# Patient Record
Sex: MALE | Race: WHITE | URBAN - METROPOLITAN AREA
[De-identification: names, ages, dates, MRNs, and addresses within clinical notes are randomized per-mention and may not be internally consistent; named-entity substitution may affect disease eponyms.]

---

## 2018-06-14 ENCOUNTER — TELEPHONE (OUTPATIENT)
Dept: FAMILY MEDICINE | Facility: CLINIC | Age: 43
End: 2018-06-14

## 2018-06-14 ENCOUNTER — OFFICE VISIT (OUTPATIENT)
Dept: FAMILY MEDICINE | Facility: CLINIC | Age: 43
End: 2018-06-14
Payer: COMMERCIAL

## 2018-06-14 VITALS
DIASTOLIC BLOOD PRESSURE: 88 MMHG | TEMPERATURE: 96.9 F | BODY MASS INDEX: 36.37 KG/M2 | HEIGHT: 68 IN | RESPIRATION RATE: 17 BRPM | SYSTOLIC BLOOD PRESSURE: 126 MMHG | OXYGEN SATURATION: 95 % | HEART RATE: 82 BPM | WEIGHT: 240 LBS

## 2018-06-14 DIAGNOSIS — Z13.1 SCREENING FOR DIABETES MELLITUS: ICD-10-CM

## 2018-06-14 DIAGNOSIS — Z90.5 HISTORY OF KIDNEY DONATION: ICD-10-CM

## 2018-06-14 DIAGNOSIS — D22.9 MULTIPLE PIGMENTED NEVI: ICD-10-CM

## 2018-06-14 DIAGNOSIS — Z83.79 FAMILY HISTORY OF CELIAC DISEASE: ICD-10-CM

## 2018-06-14 DIAGNOSIS — R80.9 PROTEINURIA, UNSPECIFIED TYPE: ICD-10-CM

## 2018-06-14 DIAGNOSIS — R06.83 SNORING: ICD-10-CM

## 2018-06-14 DIAGNOSIS — Z13.0 SCREENING, ANEMIA, DEFICIENCY, IRON: ICD-10-CM

## 2018-06-14 DIAGNOSIS — Z23 NEED FOR TDAP VACCINATION: ICD-10-CM

## 2018-06-14 DIAGNOSIS — Z00.00 ROUTINE GENERAL MEDICAL EXAMINATION AT A HEALTH CARE FACILITY: Primary | ICD-10-CM

## 2018-06-14 DIAGNOSIS — Z91.09 ENVIRONMENTAL ALLERGIES: ICD-10-CM

## 2018-06-14 DIAGNOSIS — Z13.6 ENCOUNTER FOR SCREENING FOR CARDIOVASCULAR DISORDERS: ICD-10-CM

## 2018-06-14 DIAGNOSIS — R31.29 MICROSCOPIC HEMATURIA: Primary | ICD-10-CM

## 2018-06-14 DIAGNOSIS — Z11.3 SCREEN FOR STD (SEXUALLY TRANSMITTED DISEASE): ICD-10-CM

## 2018-06-14 DIAGNOSIS — R31.29 MICROSCOPIC HEMATURIA: ICD-10-CM

## 2018-06-14 DIAGNOSIS — R80.9 MICROALBUMINURIA: ICD-10-CM

## 2018-06-14 DIAGNOSIS — R19.7 DIARRHEA, UNSPECIFIED TYPE: ICD-10-CM

## 2018-06-14 DIAGNOSIS — D23.9 DERMATOFIBROMA: ICD-10-CM

## 2018-06-14 DIAGNOSIS — B35.4 TINEA CORPORIS: ICD-10-CM

## 2018-06-14 LAB
ALBUMIN SERPL-MCNC: 3.7 G/DL (ref 3.4–5)
ALBUMIN UR-MCNC: 100 MG/DL
ALP SERPL-CCNC: 84 U/L (ref 40–150)
ALT SERPL W P-5'-P-CCNC: 51 U/L (ref 0–70)
ANION GAP SERPL CALCULATED.3IONS-SCNC: 7 MMOL/L (ref 3–14)
APPEARANCE UR: CLEAR
AST SERPL W P-5'-P-CCNC: 24 U/L (ref 0–45)
BACTERIA #/AREA URNS HPF: ABNORMAL /HPF
BASOPHILS # BLD AUTO: 0 10E9/L (ref 0–0.2)
BASOPHILS NFR BLD AUTO: 0.4 %
BILIRUB SERPL-MCNC: 0.4 MG/DL (ref 0.2–1.3)
BILIRUB UR QL STRIP: NEGATIVE
BUN SERPL-MCNC: 12 MG/DL (ref 7–30)
CALCIUM SERPL-MCNC: 8.9 MG/DL (ref 8.5–10.1)
CHLORIDE SERPL-SCNC: 107 MMOL/L (ref 94–109)
CHOLEST SERPL-MCNC: 165 MG/DL
CO2 SERPL-SCNC: 27 MMOL/L (ref 20–32)
COLOR UR AUTO: YELLOW
CREAT SERPL-MCNC: 1.07 MG/DL (ref 0.66–1.25)
DIFFERENTIAL METHOD BLD: NORMAL
EOSINOPHIL # BLD AUTO: 0.2 10E9/L (ref 0–0.7)
EOSINOPHIL NFR BLD AUTO: 1.7 %
ERYTHROCYTE [DISTWIDTH] IN BLOOD BY AUTOMATED COUNT: 13.5 % (ref 10–15)
GFR SERPL CREATININE-BSD FRML MDRD: 76 ML/MIN/1.7M2
GLUCOSE SERPL-MCNC: 103 MG/DL (ref 70–99)
GLUCOSE UR STRIP-MCNC: NEGATIVE MG/DL
HBV SURFACE AB SERPL IA-ACNC: 0 M[IU]/ML
HBV SURFACE AG SERPL QL IA: NONREACTIVE
HCT VFR BLD AUTO: 44.9 % (ref 40–53)
HCV AB SERPL QL IA: NONREACTIVE
HDLC SERPL-MCNC: 29 MG/DL
HGB BLD-MCNC: 14.9 G/DL (ref 13.3–17.7)
HGB UR QL STRIP: ABNORMAL
HIV 1+2 AB+HIV1 P24 AG SERPL QL IA: NONREACTIVE
KETONES UR STRIP-MCNC: NEGATIVE MG/DL
LDLC SERPL CALC-MCNC: 103 MG/DL
LEUKOCYTE ESTERASE UR QL STRIP: NEGATIVE
LYMPHOCYTES # BLD AUTO: 2.5 10E9/L (ref 0.8–5.3)
LYMPHOCYTES NFR BLD AUTO: 24.9 %
MCH RBC QN AUTO: 29.3 PG (ref 26.5–33)
MCHC RBC AUTO-ENTMCNC: 33.2 G/DL (ref 31.5–36.5)
MCV RBC AUTO: 88 FL (ref 78–100)
MONOCYTES # BLD AUTO: 1.1 10E9/L (ref 0–1.3)
MONOCYTES NFR BLD AUTO: 11.1 %
NEUTROPHILS # BLD AUTO: 6.1 10E9/L (ref 1.6–8.3)
NEUTROPHILS NFR BLD AUTO: 61.9 %
NITRATE UR QL: NEGATIVE
NONHDLC SERPL-MCNC: 136 MG/DL
PH UR STRIP: 5.5 PH (ref 5–7)
PLATELET # BLD AUTO: 283 10E9/L (ref 150–450)
POTASSIUM SERPL-SCNC: 4.7 MMOL/L (ref 3.4–5.3)
PROT SERPL-MCNC: 7.7 G/DL (ref 6.8–8.8)
RBC # BLD AUTO: 5.08 10E12/L (ref 4.4–5.9)
RBC #/AREA URNS AUTO: ABNORMAL /HPF
SODIUM SERPL-SCNC: 141 MMOL/L (ref 133–144)
SOURCE: ABNORMAL
SP GR UR STRIP: 1.02 (ref 1–1.03)
TRIGL SERPL-MCNC: 163 MG/DL
TSH SERPL DL<=0.005 MIU/L-ACNC: 2.98 MU/L (ref 0.4–4)
UROBILINOGEN UR STRIP-ACNC: 1 EU/DL (ref 0.2–1)
WBC # BLD AUTO: 9.8 10E9/L (ref 4–11)
WBC #/AREA URNS AUTO: ABNORMAL /HPF

## 2018-06-14 PROCEDURE — 80061 LIPID PANEL: CPT | Performed by: FAMILY MEDICINE

## 2018-06-14 PROCEDURE — 86706 HEP B SURFACE ANTIBODY: CPT | Performed by: FAMILY MEDICINE

## 2018-06-14 PROCEDURE — 90715 TDAP VACCINE 7 YRS/> IM: CPT | Performed by: FAMILY MEDICINE

## 2018-06-14 PROCEDURE — 90471 IMMUNIZATION ADMIN: CPT | Performed by: FAMILY MEDICINE

## 2018-06-14 PROCEDURE — 99386 PREV VISIT NEW AGE 40-64: CPT | Mod: 25 | Performed by: FAMILY MEDICINE

## 2018-06-14 PROCEDURE — 99213 OFFICE O/P EST LOW 20 MIN: CPT | Mod: 25 | Performed by: FAMILY MEDICINE

## 2018-06-14 PROCEDURE — 86803 HEPATITIS C AB TEST: CPT | Performed by: FAMILY MEDICINE

## 2018-06-14 PROCEDURE — 81001 URINALYSIS AUTO W/SCOPE: CPT | Performed by: FAMILY MEDICINE

## 2018-06-14 PROCEDURE — 82043 UR ALBUMIN QUANTITATIVE: CPT | Performed by: FAMILY MEDICINE

## 2018-06-14 PROCEDURE — 87389 HIV-1 AG W/HIV-1&-2 AB AG IA: CPT | Performed by: FAMILY MEDICINE

## 2018-06-14 PROCEDURE — 87086 URINE CULTURE/COLONY COUNT: CPT | Performed by: FAMILY MEDICINE

## 2018-06-14 PROCEDURE — 84443 ASSAY THYROID STIM HORMONE: CPT | Performed by: FAMILY MEDICINE

## 2018-06-14 PROCEDURE — 87340 HEPATITIS B SURFACE AG IA: CPT | Performed by: FAMILY MEDICINE

## 2018-06-14 PROCEDURE — 83516 IMMUNOASSAY NONANTIBODY: CPT | Performed by: FAMILY MEDICINE

## 2018-06-14 PROCEDURE — 36415 COLL VENOUS BLD VENIPUNCTURE: CPT | Performed by: FAMILY MEDICINE

## 2018-06-14 PROCEDURE — 83516 IMMUNOASSAY NONANTIBODY: CPT | Mod: 91 | Performed by: FAMILY MEDICINE

## 2018-06-14 PROCEDURE — 80053 COMPREHEN METABOLIC PANEL: CPT | Performed by: FAMILY MEDICINE

## 2018-06-14 PROCEDURE — 85025 COMPLETE CBC W/AUTO DIFF WBC: CPT | Performed by: FAMILY MEDICINE

## 2018-06-14 NOTE — LETTER
Nancy 15, 2018      Liliam Berg  [address] [facility] [address]        Dear ,    We are writing to inform you of your test results.    Negative for urine infection    Resulted Orders   UA reflex to Microscopic and Culture   Result Value Ref Range    Color Urine Yellow     Appearance Urine Clear     Glucose Urine Negative NEG^Negative mg/dL    Bilirubin Urine Negative NEG^Negative    Ketones Urine Negative NEG^Negative mg/dL    Specific Gravity Urine 1.020 1.003 - 1.035    Blood Urine Large (A) NEG^Negative    pH Urine 5.5 5.0 - 7.0 pH    Protein Albumin Urine 100 (A) NEG^Negative mg/dL    Urobilinogen Urine 1.0 0.2 - 1.0 EU/dL    Nitrite Urine Negative NEG^Negative    Leukocyte Esterase Urine Negative NEG^Negative    Source Midstream Urine    CBC with platelets differential   Result Value Ref Range    WBC 9.8 4.0 - 11.0 10e9/L    RBC Count 5.08 4.4 - 5.9 10e12/L    Hemoglobin 14.9 13.3 - 17.7 g/dL    Hematocrit 44.9 40.0 - 53.0 %    MCV 88 78 - 100 fl    MCH 29.3 26.5 - 33.0 pg    MCHC 33.2 31.5 - 36.5 g/dL    RDW 13.5 10.0 - 15.0 %    Platelet Count 283 150 - 450 10e9/L    Diff Method Automated Method     % Neutrophils 61.9 %    % Lymphocytes 24.9 %    % Monocytes 11.1 %    % Eosinophils 1.7 %    % Basophils 0.4 %    Absolute Neutrophil 6.1 1.6 - 8.3 10e9/L    Absolute Lymphocytes 2.5 0.8 - 5.3 10e9/L    Absolute Monocytes 1.1 0.0 - 1.3 10e9/L    Absolute Eosinophils 0.2 0.0 - 0.7 10e9/L    Absolute Basophils 0.0 0.0 - 0.2 10e9/L   Comprehensive metabolic panel   Result Value Ref Range    Sodium 141 133 - 144 mmol/L    Potassium 4.7 3.4 - 5.3 mmol/L    Chloride 107 94 - 109 mmol/L    Carbon Dioxide 27 20 - 32 mmol/L    Anion Gap 7 3 - 14 mmol/L    Glucose 103 (H) 70 - 99 mg/dL      Comment:      Fasting specimen    Urea Nitrogen 12 7 - 30 mg/dL    Creatinine 1.07 0.66 - 1.25 mg/dL    GFR Estimate 76 >60 mL/min/1.7m2      Comment:      Non  GFR Calc    GFR Estimate If Black >90 >60  mL/min/1.7m2      Comment:       GFR Calc    Calcium 8.9 8.5 - 10.1 mg/dL    Bilirubin Total 0.4 0.2 - 1.3 mg/dL    Albumin 3.7 3.4 - 5.0 g/dL    Protein Total 7.7 6.8 - 8.8 g/dL    Alkaline Phosphatase 84 40 - 150 U/L    ALT 51 0 - 70 U/L    AST 24 0 - 45 U/L   Lipid panel reflex to direct LDL Fasting   Result Value Ref Range    Cholesterol 165 <200 mg/dL    Triglycerides 163 (H) <150 mg/dL      Comment:      Borderline high:  150-199 mg/dl  High:             200-499 mg/dl  Very high:       >499 mg/dl  Fasting specimen      HDL Cholesterol 29 (L) >39 mg/dL    LDL Cholesterol Calculated 103 (H) <100 mg/dL      Comment:      Above desirable:  100-129 mg/dl  Borderline High:  130-159 mg/dL  High:             160-189 mg/dL  Very high:       >189 mg/dl      Non HDL Cholesterol 136 (H) <130 mg/dL      Comment:      Above Desirable:  130-159 mg/dl  Borderline high:  160-189 mg/dl  High:             190-219 mg/dl  Very high:       >219 mg/dl     TSH with free T4 reflex   Result Value Ref Range    TSH 2.98 0.40 - 4.00 mU/L   Hepatitis B Surface Antibody   Result Value Ref Range    Hepatitis B Surface Antibody 0.00 <8.00 m[IU]/mL      Comment:      Nonreactive, No antibody detected when the value is less than 8.00 m[IU]/mL.   Hepatitis B surface antigen   Result Value Ref Range    Hep B Surface Agn Nonreactive NR^Nonreactive   Hepatitis C Screen Reflex to HCV RNA Quant and Genotype   Result Value Ref Range    Hepatitis C Antibody Nonreactive NR^Nonreactive      Comment:      Assay performance characteristics have not been established for newborns,   infants, and children     HIV Antigen Antibody Combo   Result Value Ref Range    HIV Antigen Antibody Combo Nonreactive NR^Nonreactive          Comment:      HIV-1 p24 Ag & HIV-1/HIV-2 Ab Not Detected   Urine Microscopic   Result Value Ref Range    WBC Urine 5-10 (A) OTO5^0 - 5 /HPF    RBC Urine 25-50 (A) OTO2^O - 2 /HPF    Bacteria Urine Few (A) NEG^Negative /HPF    Urine Culture Aerobic Bacterial   Result Value Ref Range    Specimen Description Midstream Urine     Culture Micro No growth        If you have any questions or concerns, please call the clinic at the number listed above.     Sincerely,      Christal Garrison MD/nr

## 2018-06-14 NOTE — PROGRESS NOTES
SUBJECTIVE:   CC: Liliam Berg is an 42 year old male who presents for preventative health visit.     Healthy Habits:  Answers for HPI/ROS submitted by the patient on 6/14/2018   Annual Exam:  Getting at least 3 servings of Calcium per day:: NO  Bi-annual eye exam:: NO  Dental care twice a year:: NO  Sleep apnea or symptoms of sleep apnea:: Daytime drowsiness, Excessive snoring, Sleep apnea  Diet:: Regular (no restrictions)  Frequency of exercise:: None  Taking medications regularly:: Yes  Medication side effects:: None  Additional concerns today:: YES  PHQ-2 Score: 0    New to me, not seen in system since 2006 just by PT for thoracic pain at that time. Hx of prior kidney donation, No inform on [facility], [place]  negative. Here for a physical. .  Sleeping concern      Duration: 10 yrs     Description (location/character/radiation):  Snoring and drowsiness in the day time      Intensity:  severe    Accompanying signs and symptoms:  Stop breathing and irregular snoring        History (similar episodes/previous evaluation): None    Precipitating or alleviating factors: None    Therapies tried and outcome: marijuana        Also requesting kidney function lab/ test its been 20 yrs      20 yrs ago donated a left kidney. Not seen doctor since then. 10 yrs ago had a ankle injury  Bad sleep apnea,     No fever or chills, no headache or dizziness, no double or blurry vision, no facial pain, earache, sore throat, runny nose, post nasal drip, no trouble hearing, smelling, tasting or swallowing, no cough , no chest pain, trouble breathing or palpitations, stomach big , food poisoning few weeks ago , still not hundred percent, loose stools not back to regular , started couldn't eta,2 weeks ago , watery diarrhea 4 days, thought getting better last weekend again symptoms with loose stools, working back getting better, no blood or black stools, mild nausea, not vomiting, dry heaving , tolerating food ok., No abdominal pain, hx of  heart burn, reflux common, no weight loss or weight gain,  or night sweats. , warmer at night,  No dysuria, hematuria, frequency, urgency, hesitancy, incontinence, No pelvic complaints. No leg swelling or joint pain. No rash other than spots on left chest thinks from allergic reaction to detergent.     Hx of allergies to dust, pollen, cockroaches, feels intolerance to wheat , bloating for beer and wheat at times, brother has celiac.     Care every where signed and no records seen other than ankle xray in 2005    Today's PHQ-2 Score:   PHQ-2 ( 1999 [employer]) 6/14/2018 6/14/2018   Q1: Little interest or pleasure in doing things 0 0   Q2: Feeling down, depressed or hopeless 0 0   PHQ-2 Score 0 0   Q1: Little interest or pleasure in doing things Not at all -   Q2: Feeling down, depressed or hopeless Not at all -   PHQ-2 Score 0 -     Abuse: Current or Past(Physical, Sexual or Emotional)- No  Do you feel safe in your environment - YesYes    Social History   Substance Use Topics     Smoking status: Never Smoker     Smokeless tobacco: Never Used     Alcohol use Not on file      If you drink alcohol do you typically have >3 drinks per day or >7 drinks per week? Yes - AUDIT SCORE:  5  AUDIT - Alcohol Use Disorders Identification Test - Reproduced from the World Health Organization Audit 2001 (Second Edition) 6/14/2018   1.  How often do you have a drink containing alcohol? 4 or more times a week   2.  How many drinks containing alcohol do you have on a typical day when you are drinking? 1 or 2   3.  How often do you have five or more drinks on one occasion? Less than monthly   4.  How often during the last year have you found that you were not able to stop drinking once you had started? Never   5.  How often during the last year have you failed to do what was normally expected of you because of drinking? Never   6.  How often during the last year have you needed a first drink in the morning to get yourself going after a heavy  drinking session? Never   7.  How often during the last year have you had a feeling of guilt or remorse after drinking? Never   8.  How often during the last year have you been unable to remember what happened the night before because of your drinking? Never   9.  Have you or someone else been injured because of your drinking? No   10. Has a relative, friend, doctor or other health care worker been concerned about your drinking or suggested you cut down? No   TOTAL SCORE 5                         Last PSA: No results found for: PSA    Reviewed orders with patient. Reviewed health maintenance and updated orders accordingly - Yes  Labs reviewed in EPIC  BP Readings from Last 3 Encounters:   06/14/18 126/88    Wt Readings from Last 3 Encounters:   06/14/18 240 lb (108.9 kg)                  Patient Active Problem List   Diagnosis   (none) - all problems resolved or deleted     Past Surgical History:   Procedure Laterality Date     [facility] KIDNEY DONOR LIVING  1998       Social History   Substance Use Topics     Smoking status: Never Smoker     Smokeless tobacco: Never Used     Alcohol use Yes      Comment: 1 to 2 each about 4 / week      Family History   Problem Relation Age of Onset     HEART DISEASE Father      Thyroid Cancer Sister          No current outpatient prescriptions on file.     Allergies   Allergen Reactions     Cockroach      Dust Mites      Pollen Extract      No lab results found.     Reviewed and updated as needed this visit by clinical staff  Allergies         Reviewed and updated as needed this visit by Provider        Past Medical History:   Diagnosis Date     iamPAIN IN THORACIC SPINE 9/21/2006      Past Surgical History:   Procedure Laterality Date     [facility] KIDNEY DONOR LIVING  1998            ROS:  CONSTITUTIONAL: NEGATIVE for fever, chills, change in weight  INTEGUMENTARY/SKIN: NEGATIVE for worrisome rashes, moles or lesions except as noted on HPI , aloso bump right arm and left back of hand had  "a while no change.  EYES: NEGATIVE for vision changes or irritation  ENT: NEGATIVE for ear, mouth and throat problems  RESP: NEGATIVE for significant cough or SOB  CV: NEGATIVE for chest pain, palpitations or peripheral edema  GI: as in HPI   male: negative for dysuria, hematuria, decreased urinary stream, erectile dysfunction, urethral discharge  MUSCULOSKELETAL: NEGATIVE for significant arthralgias or myalgia  NEURO: NEGATIVE for weakness, dizziness or paresthesias  ENDOCRINE: NEGATIVE for temperature intolerance, skin/hair changes  HEME/ALLERGY/IMMUNE: NEGATIVE for bleeding problems  PSYCHIATRIC: NEGATIVE for changes in mood or affect    OBJECTIVE:   /88 (BP Location: Left arm, Patient Position: Chair, Cuff Size: Adult Large)  Pulse 82  Temp 96.9  F (36.1  C) (Tympanic)  Resp 17  Ht 5' 7.5\" (1.715 m)  Wt 240 lb (108.9 kg)  SpO2 95%  BMI 37.03 kg/m2  EXAM:  GENERAL: healthy, alert and no distress  EYES: Eyes grossly normal to inspection, PERRL and conjunctivae and sclerae normal  HENT: ear canals and TM's normal, nose and mouth without ulcers or lesions, oropharynx crowded, Mallampati score 3  NECK: no adenopathy, no asymmetry, masses, or scars and thyroid normal to palpation  RESP: lungs clear to auscultation - no rales, rhonchi or wheezes  CV: regular rate and rhythm, normal S1 S2, no S3 or S4, no murmur, click or rub, no peripheral edema and peripheral pulses strong  ABDOMEN: soft, non tender, no hepatosplenomegaly, no masses and bowel sounds normal, left circumferential scar form prior nephrectomy for donation to brother 20 yrs ago  MS: no gross musculoskeletal defects noted, no edema  SKIN: let anterior lower chest circular red rash with central clearing , mild raised borders about 4 to 5 of them coalescing, multiple pigmented nevi and mole son trunk and torso and arms. 1/2 cm dermatofibroma papule right arm and dorsum left hand. Otherwise no suspicious lesions or rashes  NEURO: Normal strength " and tone, mentation intact and speech normal  PSYCH: mentation appears normal, affect normal/bright    ASSESSMENT/PLAN:   1. Routine general medical examination at a health care facility  Signed a release so we can review records. Labs today. Sleep referral made. Diet, exercise, weight loss at least 5 % body weight. Allergy referral made. Tdap due. Limit alcohol to one drink in a day no more than 7 total a week. Marijuana can cause weight gain. Use over the counter athletes foot cream to rash on left chest twice a day till 1 month after resolves.   - CBC with platelets differential  - Comprehensive metabolic panel  - Lipid panel reflex to direct LDL Fasting  - Hepatitis B Surface Antibody  - Hepatitis B surface antigen  - Hepatitis C Screen Reflex to HCV RNA Quant and Genotype  - HIV Antigen Antibody Combo  - VACCINE ADMINISTRATION, INITIAL  - TDAP VACCINE (ADACEL)    2. History of kidney donation  - Albumin Random Urine Quantitative with Creat Ratio  - UA reflex to Microscopic and Culture  - CMP    3. Snoring  - TSH with free T4 reflex  - [place]AL[place] MANAG[place] [phone]  (Age 18 and up); Future  - OFFICE/OUTPT VISIT,EST,LEVL III    4. Diarrhea, unspecified type  Recent viral gastroenteritis getting better but also family history of celiac, will check   - Tissue transglutaminase danni IgA and IgG  - OFFICE/OUTPT VISIT,EST,LEVL III    5. Family history of celiac disease  - Tissue transglutaminase danni IgA and IgG    6. BMI 37.0-37.9, adult  Diet, exercise, weight loss, limit alcohol, avoid marijuana  - TSH with free T4 reflex    7. Environmental allergies  - ALLERGY/ASTHMA ADULT REFERRAL  - OFFICE/OUTPT VISIT,EST,LEVL III    8. Multiple pigmented nevi  Monitor.     9. Tinea corporis  Use over the counter athletes foot cream to rash on left chest twice a day till 1 month after resolves  - OFFICE/OUTPT VISIT,EST,LEVL III    10. Dermatofibroma  Right arm and  "left dorsum hand about 1/2 cm papule, monitor.    11. Need for Tdap vaccination  - VACCINE ADMINISTRATION, INITIAL  - TDAP VACCINE (ADACEL)    12. Screening, anemia, deficiency, iron  - CBC with platelets differential    13. Screening for diabetes mellitus  - Comprehensive metabolic panel    14. Encounter for screening for cardiovascular disorders  - Lipid panel reflex to direct LDL Fasting    15. Screen for STD (sexually transmitted disease)  Declines need for GC , syphilis, given prior surgery check for below.  - Hepatitis B Surface Antibody  - Hepatitis B surface antigen  - Hepatitis C Screen Reflex to HCV RNA Quant and Genotype  - HIV Antigen Antibody Combo    COUNSELING:  Reviewed preventive health counseling, as reflected in patient instructions       Regular exercise       Healthy diet/nutrition       Vision screening       Hearing screening       Immunizations    Vaccinated for: TDAP             Alcohol Use       Safe sex practices/STD prevention       Consider Hep C screening for patients born between 1945 and 1965       HIV screeninx in teen years, 1x in adult years, and at intervals if high risk    BP Screening:   Last 3 BP Readings:    BP Readings from Last 3 Encounters:   18 126/88       The following was recommended to the patient:  Re-screen BP within a year and recommended lifestyle modifications   reports that he has never smoked. He has never used smokeless tobacco.    Estimated body mass index is 37.03 kg/(m^2) as calculated from the following:    Height as of this encounter: 5' 7.5\" (1.715 m).    Weight as of this encounter: 240 lb (108.9 kg).   Weight management plan: Discussed healthy diet and exercise guidelines and patient will follow up in 12 months in clinic to re-evaluate.    Counseling Resources:  ATP IV Guidelines  Pooled Cohorts Equation Calculator  FRAX Risk Assessment  ICSI Preventive Guidelines  Dietary Guidelines for Americans, 2010  USDA's MyPlate  ASA Prophylaxis  Lung " CA Screening    Christal Garrison MD  [facility]

## 2018-06-14 NOTE — TELEPHONE ENCOUNTER
Cbc normal  Urine shows some white cells, protein and bacteria, will add culture to see whats going on as await rest of labs. Want to rule out infection.   If negative may recommend rechecking urine sample later in the day as morning samples sometimes show blood and protein at times. If repeat positive would then recommend to see nephrology if needed.   Can close encounter when done

## 2018-06-14 NOTE — LETTER
June 14, 2018      Liliam Berg  [address] [facility] [address]        Dear ,    We are writing to inform you of your test results.    Normal red blood cell (hgb) levels, normal white blood cell count and normal platelet levels. Rest of labs pending .    Resulted Orders   CBC with platelets differential   Result Value Ref Range    WBC 9.8 4.0 - 11.0 10e9/L    RBC Count 5.08 4.4 - 5.9 10e12/L    Hemoglobin 14.9 13.3 - 17.7 g/dL    Hematocrit 44.9 40.0 - 53.0 %    MCV 88 78 - 100 fl    MCH 29.3 26.5 - 33.0 pg    MCHC 33.2 31.5 - 36.5 g/dL    RDW 13.5 10.0 - 15.0 %    Platelet Count 283 150 - 450 10e9/L    Diff Method Automated Method     % Neutrophils 61.9 %    % Lymphocytes 24.9 %    % Monocytes 11.1 %    % Eosinophils 1.7 %    % Basophils 0.4 %    Absolute Neutrophil 6.1 1.6 - 8.3 10e9/L    Absolute Lymphocytes 2.5 0.8 - 5.3 10e9/L    Absolute Monocytes 1.1 0.0 - 1.3 10e9/L    Absolute Eosinophils 0.2 0.0 - 0.7 10e9/L    Absolute Basophils 0.0 0.0 - 0.2 10e9/L     If you have any questions or concerns, please call the clinic at the number listed above.   Sincerely,  Christal Garrison MD/nr

## 2018-06-14 NOTE — TELEPHONE ENCOUNTER
Dr Garrison,    Can we wait until the labs all done before calling pt since she was just seen today and asymptomatic?    Silvia Keyes, RN, BSN

## 2018-06-14 NOTE — PROGRESS NOTES
-Normal red blood cell (hgb) levels, normal white blood cell count and normal platelet levels. Rest of labs pending .

## 2018-06-14 NOTE — LETTER
Nancy 15, 2018      Liliam Berg  [address] [address]        Dear ,    We are writing to inform you of your test results.    Results within acceptable limits.   Negative for Hepatis B but not immune consider vaccination against hepatitis B with series of 3 shots. If desires can schedule to get in an [place] only appt  Negative for HIV     -Liver and gallbladder tests (ALT,AST, Alk phos,bilirubin) are normal.  -Kidney function (GFR) is normal.  -Sodium is normal.  -Potassium is normal.  -Glucose is slight elevated and may be sign of early diabetes (prediabetes). ADVISE:: low carbohydrate diet, exercise, try to lose weight (if necessary) and recheck glucose in 12 months. (GLU,A1C, DX: prediabetes)  -LDL(bad) cholesterol level is elevated, HDL(good) cholesterol level is low and your triglycerides are elevated which can increase your heart disease risk.  A diet high in fat and simple carbohydrates, genetics and being overweight can contribute to this. ADVISE: Exercise, a low fat, low carbohydrate diet, weight control, and omega-3 fatty acids (fish oil) 6069-1641 mg daily are helpful to improve this.  Rechecking your fasting cholesterol panel in 12 months is recommended (Lipid w/ LDL reflex, DX: hyperlipidemia)  -TSH (thyroid stimulating hormone) level is normal which indicates normal thyroid function.  -Hepatitis C antibody screen test shows no signs of a previous hepatitis C infection.    Resulted Orders   UA reflex to Microscopic and Culture   Result Value Ref Range    Color Urine Yellow     Appearance Urine Clear     Glucose Urine Negative NEG^Negative mg/dL    Bilirubin Urine Negative NEG^Negative    Ketones Urine Negative NEG^Negative mg/dL    Specific Gravity Urine 1.020 1.003 - 1.035    Blood Urine Large (A) NEG^Negative    pH Urine 5.5 5.0 - 7.0 pH    Protein Albumin Urine 100 (A) NEG^Negative mg/dL    Urobilinogen Urine 1.0 0.2 - 1.0 EU/dL    Nitrite Urine Negative NEG^Negative     Leukocyte Esterase Urine Negative NEG^Negative    Source Midstream Urine    CBC with platelets differential   Result Value Ref Range    WBC 9.8 4.0 - 11.0 10e9/L    RBC Count 5.08 4.4 - 5.9 10e12/L    Hemoglobin 14.9 13.3 - 17.7 g/dL    Hematocrit 44.9 40.0 - 53.0 %    MCV 88 78 - 100 fl    MCH 29.3 26.5 - 33.0 pg    MCHC 33.2 31.5 - 36.5 g/dL    RDW 13.5 10.0 - 15.0 %    Platelet Count 283 150 - 450 10e9/L    Diff Method Automated Method     % Neutrophils 61.9 %    % Lymphocytes 24.9 %    % Monocytes 11.1 %    % Eosinophils 1.7 %    % Basophils 0.4 %    Absolute Neutrophil 6.1 1.6 - 8.3 10e9/L    Absolute Lymphocytes 2.5 0.8 - 5.3 10e9/L    Absolute Monocytes 1.1 0.0 - 1.3 10e9/L    Absolute Eosinophils 0.2 0.0 - 0.7 10e9/L    Absolute Basophils 0.0 0.0 - 0.2 10e9/L   Comprehensive metabolic panel   Result Value Ref Range    Sodium 141 133 - 144 mmol/L    Potassium 4.7 3.4 - 5.3 mmol/L    Chloride 107 94 - 109 mmol/L    Carbon Dioxide 27 20 - 32 mmol/L    Anion Gap 7 3 - 14 mmol/L    Glucose 103 (H) 70 - 99 mg/dL      Comment:      Fasting specimen    Urea Nitrogen 12 7 - 30 mg/dL    Creatinine 1.07 0.66 - 1.25 mg/dL    GFR Estimate 76 >60 mL/min/1.7m2      Comment:      Non  GFR Calc    GFR Estimate If Black >90 >60 mL/min/1.7m2      Comment:       GFR Calc    Calcium 8.9 8.5 - 10.1 mg/dL    Bilirubin Total 0.4 0.2 - 1.3 mg/dL    Albumin 3.7 3.4 - 5.0 g/dL    Protein Total 7.7 6.8 - 8.8 g/dL    Alkaline Phosphatase 84 40 - 150 U/L    ALT 51 0 - 70 U/L    AST 24 0 - 45 U/L   Lipid panel reflex to direct LDL Fasting   Result Value Ref Range    Cholesterol 165 <200 mg/dL    Triglycerides 163 (H) <150 mg/dL      Comment:      Borderline high:  150-199 mg/dl  High:             200-499 mg/dl  Very high:       >499 mg/dl  Fasting specimen      HDL Cholesterol 29 (L) >39 mg/dL    LDL Cholesterol Calculated 103 (H) <100 mg/dL      Comment:      Above desirable:  100-129 mg/dl  Borderline  High:  130-159 mg/dL  High:             160-189 mg/dL  Very high:       >189 mg/dl      Non HDL Cholesterol 136 (H) <130 mg/dL      Comment:      Above Desirable:  130-159 mg/dl  Borderline high:  160-189 mg/dl  High:             190-219 mg/dl  Very high:       >219 mg/dl     TSH with free T4 reflex   Result Value Ref Range    TSH 2.98 0.40 - 4.00 mU/L   Hepatitis B Surface Antibody   Result Value Ref Range    Hepatitis B Surface Antibody 0.00 <8.00 m[IU]/mL      Comment:      Nonreactive, No antibody detected when the value is less than 8.00 m[IU]/mL.   Hepatitis B surface antigen   Result Value Ref Range    Hep B Surface Agn Nonreactive NR^Nonreactive   Hepatitis C Screen Reflex to HCV RNA Quant and Genotype   Result Value Ref Range    Hepatitis C Antibody Nonreactive NR^Nonreactive      Comment:      Assay performance characteristics have not been established for newborns,   infants, and children     HIV Antigen Antibody Combo   Result Value Ref Range    HIV Antigen Antibody Combo Nonreactive NR^Nonreactive          Comment:      HIV-1 p24 Ag & HIV-1/HIV-2 Ab Not Detected   Urine Microscopic   Result Value Ref Range    WBC Urine 5-10 (A) OTO5^0 - 5 /HPF    RBC Urine 25-50 (A) OTO2^O - 2 /HPF    Bacteria Urine Few (A) NEG^Negative /HPF       If you have any questions or concerns, please call the clinic at the number listed above.       Sincerely,        Christal Garrison MD/nr

## 2018-06-14 NOTE — NURSING NOTE
Screening Questionnaire for Adult Immunization    Are you sick today?   No   Do you have allergies to medications, food, a vaccine component or latex?   No   Have you ever had a serious reaction after receiving a vaccination?   No   Do you have a long-term health problem with heart disease, lung disease, asthma, kidney disease, metabolic disease (e.g. diabetes), anemia, or other blood disorder?   No   Do you have cancer, leukemia, HIV/AIDS, or any other immune system problem?   No   In the past 3 months, have you taken medications that affect  your immune system, such as prednisone, other steroids, or anticancer drugs; drugs for the treatment of rheumatoid arthritis, Crohn s disease, or psoriasis; or have you had radiation treatments?   No   Have you had a seizure, or a brain or other nervous system problem?   No   During the past year, have you received a transfusion of blood or blood     products, or been given immune (gamma) globulin or antiviral drug?   No   For women: Are you pregnant or is there a chance you could become        pregnant during the next month?   No   Have you received any vaccinations in the past 4 weeks?   No     Immunization questionnaire answers were all negative.        Per orders of Christal Goss, injection of Tdap given by Vivi Resendiz. Patient instructed to remain in clinic for 15 minutes afterwards, and to report any adverse reaction to me immediately.       Screening performed by Vivi Resendiz on 6/14/2018 at 9:09 AM.

## 2018-06-15 LAB
BACTERIA SPEC CULT: NO GROWTH
CREAT UR-MCNC: 143 MG/DL
MICROALBUMIN UR-MCNC: 271 MG/L
MICROALBUMIN/CREAT UR: 189.51 MG/G CR (ref 0–17)
SPECIMEN SOURCE: NORMAL

## 2018-06-15 NOTE — PROGRESS NOTES
Results within acceptable limits.   Negative for Hepatis B but not immune consider vaccination against hepatitis B with series of 3 shots. If desires can schedule to get in an MA only appt  Negative for HIV     -Liver and gallbladder tests (ALT,AST, Alk phos,bilirubin) are normal.  -Kidney function (GFR) is normal.  -Sodium is normal.  -Potassium is normal.  -Glucose is slight elevated and may be sign of early diabetes (prediabetes). ADVISE:: low carbohydrate diet, exercise, try to lose weight (if necessary) and recheck glucose in 12 months. (GLU,A1C, DX: prediabetes)  -LDL(bad) cholesterol level is elevated, HDL(good) cholesterol level is low and your triglycerides are elevated which can increase your heart disease risk.  A diet high in fat and simple carbohydrates, genetics and being overweight can contribute to this. ADVISE: Exercise, a low fat, low carbohydrate diet, weight control, and omega-3 fatty acids (fish oil) 3855-6923 mg daily are helpful to improve this.  Rechecking your fasting cholesterol panel in 12 months is recommended (Lipid w/ LDL reflex, DX: hyperlipidemia)  -TSH (thyroid stimulating hormone) level is normal which indicates normal thyroid function.  -Hepatitis C antibody screen test shows no signs of a previous hepatitis C infection.

## 2018-06-15 NOTE — TELEPHONE ENCOUNTER
Patient was sent My Chart message with provider comments and referral information.  Mylene Carrillo RN

## 2018-06-15 NOTE — TELEPHONE ENCOUNTER
micro albumin elevated ( loss of protein from kidneys  Some red cells in urine  No urine infection seen on culture to explain this  Celiac testing not back but rest of testing including kidney function normal   See below note sent earlier.   Given microalbuminuria, only one kidney, ( due to prior donation ) and blood cells in urine recommend seeing nephrology to be further evaluated. Referral placed     Negative for Hepatis B but not immune consider vaccination against hepatitis B with series of 3 shots. If desires can schedule to get in an MA only appt  Negative for HIV     -Liver and gallbladder tests (ALT,AST, Alk phos,bilirubin) are normal.  -Kidney function (GFR) is normal.  -Sodium is normal.  -Potassium is normal.  -Glucose is slight elevated and may be sign of early diabetes (prediabetes). ADVISE:: low carbohydrate diet, exercise, try to lose weight (if necessary) and recheck glucose in 12 months. (GLU,A1C, DX: prediabetes)  -LDL(bad) cholesterol level is elevated, HDL(good) cholesterol level is low and your triglycerides are elevated which can increase your heart disease risk.  A diet high in fat and simple carbohydrates, genetics and being overweight can contribute to this. ADVISE: Exercise, a low fat, low carbohydrate diet, weight control, and omega-3 fatty acids (fish oil) 1604-3219 mg daily are helpful to improve this.  Rechecking your fasting cholesterol panel in 12 months is recommended (Lipid w/ LDL reflex, DX: hyperlipidemia)  -TSH (thyroid stimulating hormone) level is normal which indicates normal thyroid function.  -Hepatitis C antibody screen test shows no signs of a previous hepatitis C infection.

## 2018-06-18 LAB
TTG IGA SER-ACNC: 5 U/ML
TTG IGG SER-ACNC: <1 U/ML

## 2018-06-18 NOTE — PROGRESS NOTES
Elysia Mr. Berg,  Your results came back negative for celiac. If you have any further concerns please do not hesitate to contact us by message, phone or making an appointment.  Have a good day   Dr Bladimir FRANCO

## 2018-06-25 ENCOUNTER — OFFICE VISIT (OUTPATIENT)
Dept: FAMILY MEDICINE | Facility: CLINIC | Age: 43
End: 2018-06-25
Payer: COMMERCIAL

## 2018-06-25 VITALS
SYSTOLIC BLOOD PRESSURE: 121 MMHG | OXYGEN SATURATION: 95 % | TEMPERATURE: 98.6 F | RESPIRATION RATE: 17 BRPM | WEIGHT: 234.75 LBS | HEART RATE: 81 BPM | BODY MASS INDEX: 36.22 KG/M2 | DIASTOLIC BLOOD PRESSURE: 86 MMHG

## 2018-06-25 DIAGNOSIS — R31.29 MICROSCOPIC HEMATURIA: ICD-10-CM

## 2018-06-25 DIAGNOSIS — E66.01 MORBID OBESITY (H): ICD-10-CM

## 2018-06-25 DIAGNOSIS — R19.7 DIARRHEA, UNSPECIFIED TYPE: Primary | ICD-10-CM

## 2018-06-25 DIAGNOSIS — Z78.9 NOT IMMUNE TO HEPATITIS B VIRUS: ICD-10-CM

## 2018-06-25 DIAGNOSIS — R80.9 MICROALBUMINURIA: ICD-10-CM

## 2018-06-25 DIAGNOSIS — R80.9 PROTEINURIA, UNSPECIFIED TYPE: ICD-10-CM

## 2018-06-25 DIAGNOSIS — E78.00 ELEVATED LDL CHOLESTEROL LEVEL: ICD-10-CM

## 2018-06-25 DIAGNOSIS — Z90.5 HISTORY OF KIDNEY DONATION: ICD-10-CM

## 2018-06-25 PROBLEM — Z91.09 ENVIRONMENTAL ALLERGIES: Status: ACTIVE | Noted: 2018-06-25

## 2018-06-25 PROBLEM — D23.9 DERMATOFIBROMA: Status: ACTIVE | Noted: 2018-06-25

## 2018-06-25 PROBLEM — Z83.79 FAMILY HISTORY OF CELIAC DISEASE: Status: ACTIVE | Noted: 2018-06-25

## 2018-06-25 PROBLEM — D22.9 MULTIPLE PIGMENTED NEVI: Status: ACTIVE | Noted: 2018-06-25

## 2018-06-25 PROBLEM — R06.83 SNORING: Status: ACTIVE | Noted: 2018-06-25

## 2018-06-25 LAB
ALBUMIN UR-MCNC: 30 MG/DL
APPEARANCE UR: CLEAR
BACTERIA #/AREA URNS HPF: ABNORMAL /HPF
BILIRUB UR QL STRIP: NEGATIVE
COLOR UR AUTO: YELLOW
GLUCOSE UR STRIP-MCNC: NEGATIVE MG/DL
HGB UR QL STRIP: ABNORMAL
KETONES UR STRIP-MCNC: NEGATIVE MG/DL
LEUKOCYTE ESTERASE UR QL STRIP: NEGATIVE
NITRATE UR QL: NEGATIVE
PH UR STRIP: 5.5 PH (ref 5–7)
RBC #/AREA URNS AUTO: ABNORMAL /HPF
SOURCE: ABNORMAL
SP GR UR STRIP: 1.01 (ref 1–1.03)
UROBILINOGEN UR STRIP-ACNC: 0.2 EU/DL (ref 0.2–1)
WBC #/AREA URNS AUTO: ABNORMAL /HPF

## 2018-06-25 PROCEDURE — 80053 COMPREHEN METABOLIC PANEL: CPT | Performed by: FAMILY MEDICINE

## 2018-06-25 PROCEDURE — 81001 URINALYSIS AUTO W/SCOPE: CPT | Performed by: FAMILY MEDICINE

## 2018-06-25 PROCEDURE — 99214 OFFICE O/P EST MOD 30 MIN: CPT | Performed by: FAMILY MEDICINE

## 2018-06-25 PROCEDURE — 36415 COLL VENOUS BLD VENIPUNCTURE: CPT | Performed by: FAMILY MEDICINE

## 2018-06-25 PROCEDURE — 82043 UR ALBUMIN QUANTITATIVE: CPT | Performed by: FAMILY MEDICINE

## 2018-06-25 NOTE — PROGRESS NOTES
SUBJECTIVE:   Liliam Berg is a 42 year old male who presents to clinic today for the following health issues:    Pt is here due to possible exposure to Cyclospora at a local bar and debora, states there was a news article about the breakout, matches the timeframe when he was at the place, ate there 3 times during these times. Has been having all the symptoms that were listed: watery diarrhea, lost of appetite, stomach cramp , nausea, dehydration, weight loss.  He spoke to the dept of health & is here for testing.     Seen 6/14/18 for a routine preventive physical after not doctoring in a long time. Prior thoracic back pain treated with PT in 2006 had resolved & noted had prior left kidney donated. Noted allergies to dust, pollen, cockroach & noted boating with wheat & beer & FH of brother with celiac.was referred to allergy & to sleep for snoring. Advised OTC athletes foot cream to tinea corporis rash on chest past 1 month. Was given Tdap, reassured on multiple pigmented nevi & std screening done. He mentioned having loose watery stool that started 2 weeks prior with cramping & bloating then that seemed to be getting better. His UA was negative for UTI, but showed microscopic hematuria & microalbuminuria. His celiac test, cbc, cp, TSH, hep C, hep B, HIV, were unremarkable except for elevated glucose ( not fasting ) elevated LDL & low HDL Also noted proteinuria of 100 on u dip.     The loose watery stool have been going on now 4 weeks. Amounts are up and down. First 2 weeks were worse, now can occur every couple days. Like today went three times before 830 am & by third time it was loose. Has lost about 10 lbs since illness began & of that 6 lbs since last seen on 6/14. Knows he is obese & feels good start to loosing weight but not healthy way to loose weight from an infection. No fever or chills, no headache or dizziness, no double or blurry vision, no facial pain, earache, sore throat, runny nose, post nasal  drip, no trouble hearing, smelling, tasting or swallowing, no cough, no chest pain, trouble breathing or palpitations, no nausea or vomiting, no blood in stools or black stools, feels malaise, no night sweats. No dysuria, hematuria, frequency, urgency, hesitancy, incontinence, No pelvic complaints. No leg swelling or joint pain. No rash.    Agreeable to retesting kidney function & urine.     Problem list and histories reviewed & adjusted, as indicated.  Additional history: as documented    Patient Active Problem List   Diagnosis     BMI 37.0-37.9, adult     History of kidney donation     Snoring     Family history of celiac disease     Environmental allergies     Multiple pigmented nevi     Dermatofibroma     Not immune to hepatitis B virus     Elevated LDL cholesterol level     Morbid obesity (H)     Past Surgical History:   Procedure Laterality Date     [facility]  1998       Social History   Substance Use Topics     Smoking status: Never Smoker     Smokeless tobacco: Never Used     Alcohol use Yes      Comment: 1 to 2 each about 4 / week      Family History   Problem Relation Age of Onset     HEART DISEASE Father      Thyroid Cancer Sister          No current outpatient prescriptions on file.     Allergies   Allergen Reactions     Cockroach      Dust Mites      Pollen Extract      Recent Labs   Lab Test  06/14/18   0910   LDL  103*   HDL  29*   TRIG  163*   ALT  51   CR  1.07   GFRESTIMATED  76   GFRESTBLACK  >90   POTASSIUM  4.7   TSH  2.98      BP Readings from Last 3 Encounters:   06/25/18 121/86   06/14/18 126/88    Wt Readings from Last 3 Encounters:   06/25/18 234 lb 12 oz (106.5 kg)   06/14/18 240 lb (108.9 kg)                  Labs reviewed in EPIC    Reviewed and updated as needed this visit by clinical staff  Tobacco  Allergies  Meds  Med Hx  Surg Hx  Fam Hx  Soc Hx      Reviewed and updated as needed this visit by Provider         ROS:  Constitutional, HEENT, cardiovascular,  pulmonary, GI, , musculoskeletal, neuro, skin, endocrine and psych systems are negative, except as otherwise noted.    OBJECTIVE:     /86 (BP Location: Left arm, Patient Position: Chair, Cuff Size: Adult Large)  Pulse 81  Temp 98.6  F (37  C) (Oral)  Resp 17  Wt 234 lb 12 oz (106.5 kg)  SpO2 95%  BMI 36.22 kg/m2  Body mass index is 36.22 kg/(m^2).  GENERAL: healthy, alert, no distress and obese  EYES: Eyes grossly normal to inspection, PERRL and conjunctivae and sclerae normal  HENT: ear canals and TM's normal, nose and mouth without ulcers or lesions  NECK: no adenopathy, no asymmetry, masses, or scars and thyroid normal to palpation  RESP: lungs clear to auscultation - no rales, rhonchi or wheezes  CV: regular rate and rhythm, normal S1 S2, no S3 or S4, no murmur, click or rub, no peripheral edema and peripheral pulses strong  ABDOMEN: soft, non tender, no hepatosplenomegaly, no masses and bowel sounds normal  MS: no gross musculoskeletal defects noted, no edema  SKIN: no suspicious lesions or rashes  NEURO: Normal strength and tone, mentation intact and speech normal  PSYCH: mentation appears normal, affect normal/bright    Diagnostic Test Results:  Results for orders placed or performed in visit on 06/25/18 (from the past 24 hour(s))   UA reflex to Microscopic and Culture   Result Value Ref Range    Color Urine Yellow     Appearance Urine Clear     Glucose Urine Negative NEG^Negative mg/dL    Bilirubin Urine Negative NEG^Negative    Ketones Urine Negative NEG^Negative mg/dL    Specific Gravity Urine 1.015 1.003 - 1.035    Blood Urine Large (A) NEG^Negative    pH Urine 5.5 5.0 - 7.0 pH    Protein Albumin Urine 30 (A) NEG^Negative mg/dL    Urobilinogen Urine 0.2 0.2 - 1.0 EU/dL    Nitrite Urine Negative NEG^Negative    Leukocyte Esterase Urine Negative NEG^Negative    Source Midstream Urine    Urine Microscopic   Result Value Ref Range    WBC Urine 0 - 5 OTO5^0 - 5 /HPF    RBC Urine 5-10 (A) OTO2^O -  2 /HPF    Bacteria Urine Few (A) NEG^Negative /HPF       ASSESSMENT/PLAN:     1. Diarrhea, unspecified type  Labs and stool test today given recent Cyclospora outbreak & his symptoms are typical of this & started after eating at the place in question at least three times during news article a bout when outbreak occurred. Lost weight since started. Looks hydrated. No red flag signs. Will see what stool tests show & will go from there. Keep up with fluids. Go to the ER if worse or blood in stools. See nephrology if still having protein in urine. Keep sleep apt. See allergist in future. Consider Hep B vaccine once better as not immune. Address tinea corporis at next visit as forgot to ask how it was going with OTC treatment.   - Comprehensive metabolic panel  - Clostridium difficile Toxin B PCR; Future  - Enteric Bacteria and Virus Panel by ANA LILIA Stool; Future  - Cryptosporidium in stool, stain; Future  - NEPHROLOGY ADULT REFERRAL  - Urine Microscopic  - Cryptosporidium in stool stain; Future    2. Proteinuria, unspecified type  - Albumin Random Urine Quantitative with Creat Ratio  - UA reflex to Microscopic and Culture  - Comprehensive metabolic panel  - NEPHROLOGY ADULT REFERRAL    3. Microalbuminuria  - Albumin Random Urine Quantitative with Creat Ratio  - UA reflex to Microscopic and Culture  - Comprehensive metabolic panel  - NEPHROLOGY ADULT REFERRAL    4. Microscopic hematuria  - Albumin Random Urine Quantitative with Creat Ratio  - UA reflex to Microscopic and Culture  - Comprehensive metabolic panel  - NEPHROLOGY ADULT REFERRAL    5. History of kidney donation  - NEPHROLOGY ADULT REFERRAL    6. Not immune to hepatitis B virus  Hep B vaccine recommended when better    7. Elevated LDL cholesterol level  Recheck fasting in 1 yr. Work on diet, exercise, healthy weight loss.    8. Morbid obesity (H)  Work on diet, exercise, healthy weight loss. To see sleep in July for snoring.     9. Tinea corporis   Address tinea  corporis at next visit as forgot to ask how it was going with OTC treatment.     See Patient Instructions    Christal Garrison MD  [facility]

## 2018-06-25 NOTE — PATIENT INSTRUCTIONS
Labs and stool test   Will go from there  Keep up with fluids  See nephrology if still having protein  Keep sleep apt  See allergist in future  consider Hep B vaccine once better as not immune

## 2018-06-25 NOTE — MR AVS SNAPSHOT
After Visit Summary   6/25/2018    Liliam Berg    MRN: [id number]           Patient Information     Date Of Birth          1975        Visit Information        Provider Department      6/25/2018 2:00 PM Christal Garrison MD [facility]        Today's Diagnoses     Diarrhea, unspecified type    -  1    Proteinuria, unspecified type        Microalbuminuria        Microscopic hematuria        History of kidney donation        Tinea corporis        Not immune to hepatitis B virus          Care Instructions    Labs and stool test   Will go from there  Keep up with fluids  See nephrology if still having protein  Keep sleep apt  See allergist in future  consider Hep B vaccine once better if not immune            Follow-ups after your visit        Additional Services     NEPHROLOGY ADULT REFERRAL       Your provider has referred you to: [facility]: [facility] [place] [phone]   [email or web address]  [facility]: [facility]Nephrology[facility] [phone]   [email or web address]    Please be aware that coverage of these services is subject to the terms and limitations of your health insurance plan.  Call member services at your health plan with any benefit or coverage questions.      Reason for referral:  Proteinuria &  Unexplained hematuria. And diarrhea Please bring the following to your appointment:    >>   Any x-rays, CTs or MRIs which have been performed.  Contact the facility where they were done to arrange for  prior to your scheduled appointment.   >>   List of current medications   >>   This referral request   >>   Any documents/labs given to you for this referral                  Your next 10 appointments already scheduled     Jul 02, 2018 10:00 AM CDT   New Sleep Patient with Shaina Bobby MD    [facility], Sleep Study ([facility])    [address] [place] [id number] [phone]              Future tests that were ordered for you today     Open Future Orders        Priority Expected Expires Ordered    Clostridium difficile Toxin B PCR Routine  7/25/2018 6/25/2018    Enteric Bacteria and Virus Panel by ANA LILIA Stool Routine  6/25/2019 6/25/2018    Cryptosporidium in stool, stain Routine  6/25/2019 6/25/2018            Who to contact     If you have questions or need follow up information about today's clinic visit or your schedule please contact [facility] directly at [phone].  Normal or non-critical lab and imaging results will be communicated to you by [employer], letter or phone within 4 business days after the clinic has received the results. If you do not hear from us within 7 days, please contact the clinic through Webtabt or phone. If you have a critical or abnormal lab result, we will notify you by phone as soon as possible.  Submit refill requests through [employer] or call your pharmacy and they will forward the refill request to us. Please allow 3 business days for your refill to be completed.          Additional Information About Your Visit        [employer] Information     [employer] gives you secure access to your electronic health record. If you see a primary care provider, you can also send messages to your care team and make appointments. If you have questions, please call your primary care clinic.  If you do not have a primary care provider, please call [phone] and they will assist you.        Care EveryWhere ID     This is your Care EveryWhere ID. This could be used by other organizations to access your [place] medical records  [id number]        Your Vitals Were     Pulse Temperature Respirations Pulse Oximetry BMI (Body Mass Index)       81 98.6  F (37  C) (Oral) 17 95% 36.22 kg/m2        Blood Pressure from Last  3 Encounters:   06/25/18 121/86   06/14/18 126/88    Weight from Last 3 Encounters:   06/25/18 234 lb 12 oz (106.5 kg)   06/14/18 240 lb (108.9 kg)              We Performed the Following     Albumin Random Urine Quantitative with Creat Ratio     Comprehensive metabolic panel     NEPHROLOGY ADULT REFERRAL     UA reflex to Microscopic and Culture        Primary Care Provider Office Phone # Fax #    Christal Garrison [phone] [address]  [facility] [address]        Equal Access to Services     GABRIELLA MORAN : Hadii aad ku hadasho Soomaali, waaxda luqadaha, qaybta kaalmada adeegyada, waxalban smithin hayaan ubaldo lechuga . So [facility] [phone].    ATENCIÓN: Si habla español, tiene a tapia disposición servicios gratuitos de asistencia lingüística. Llame al [phone].    We comply with applicable federal civil rights laws and [place] laws. We do not discriminate on the basis of race, color, national origin, age, disability, sex, sexual orientation, or gender identity.            Thank you!     Thank you for choosing [facility]  for your care. Our goal is always to provide you with excellent care. Hearing back from our patients is one way we can continue to improve our services. Please take a few minutes to complete the written survey that you may receive in the mail after your visit with us. Thank you!             Your Updated Medication List - Protect others around you: Learn how to safely use, store and throw away your medicines at www.disposemymeds.org.      Notice  As of 6/25/2018  2:33 PM    You have not been prescribed any medications.

## 2018-06-26 DIAGNOSIS — R19.7 DIARRHEA, UNSPECIFIED TYPE: ICD-10-CM

## 2018-06-26 LAB
ALBUMIN SERPL-MCNC: 3.7 G/DL (ref 3.4–5)
ALP SERPL-CCNC: 77 U/L (ref 40–150)
ALT SERPL W P-5'-P-CCNC: 39 U/L (ref 0–70)
ANION GAP SERPL CALCULATED.3IONS-SCNC: 8 MMOL/L (ref 3–14)
AST SERPL W P-5'-P-CCNC: 16 U/L (ref 0–45)
BILIRUB SERPL-MCNC: 0.4 MG/DL (ref 0.2–1.3)
BUN SERPL-MCNC: 10 MG/DL (ref 7–30)
C COLI+JEJUNI+LARI FUSA STL QL NAA+PROBE: NOT DETECTED
C DIFF TOX B STL QL: NEGATIVE
CALCIUM SERPL-MCNC: 8.9 MG/DL (ref 8.5–10.1)
CHLORIDE SERPL-SCNC: 106 MMOL/L (ref 94–109)
CO2 SERPL-SCNC: 26 MMOL/L (ref 20–32)
CREAT SERPL-MCNC: 1 MG/DL (ref 0.66–1.25)
CREAT UR-MCNC: 102 MG/DL
EC STX1 GENE STL QL NAA+PROBE: NOT DETECTED
EC STX2 GENE STL QL NAA+PROBE: NOT DETECTED
ENTERIC PATHOGEN COMMENT: NORMAL
GFR SERPL CREATININE-BSD FRML MDRD: 82 ML/MIN/1.7M2
GLUCOSE SERPL-MCNC: 88 MG/DL (ref 70–99)
MICROALBUMIN UR-MCNC: 125 MG/L
MICROALBUMIN/CREAT UR: 122.55 MG/G CR (ref 0–17)
NOROV GI+II ORF1-ORF2 JNC STL QL NAA+PR: NOT DETECTED
POTASSIUM SERPL-SCNC: 4.2 MMOL/L (ref 3.4–5.3)
PROT SERPL-MCNC: 7.6 G/DL (ref 6.8–8.8)
RVA NSP5 STL QL NAA+PROBE: NOT DETECTED
SALMONELLA SP RPOD STL QL NAA+PROBE: NOT DETECTED
SHIGELLA SP+EIEC IPAH STL QL NAA+PROBE: NOT DETECTED
SODIUM SERPL-SCNC: 140 MMOL/L (ref 133–144)
SPECIMEN SOURCE: NORMAL
V CHOL+PARA RFBL+TRKH+TNAA STL QL NAA+PR: NOT DETECTED
Y ENTERO RECN STL QL NAA+PROBE: NOT DETECTED

## 2018-06-26 PROCEDURE — 87493 C DIFF AMPLIFIED PROBE: CPT | Mod: 59 | Performed by: FAMILY MEDICINE

## 2018-06-26 PROCEDURE — 87206 SMEAR FLUORESCENT/ACID STAI: CPT | Performed by: FAMILY MEDICINE

## 2018-06-26 PROCEDURE — 87506 IADNA-DNA/RNA PROBE TQ 6-11: CPT | Performed by: FAMILY MEDICINE

## 2018-06-26 NOTE — PROGRESS NOTES
Elysia Mr. Berg,  Your results came back and are within acceptable limits. -Liver and gallbladder tests are normal. (ALT,AST, Alk phos, bilirubin), kidney function is normal (Cr, GFR), Sodium is normal, Potassium is normal, Calcium is normal, Glucose is normal (diabetes screening test). . If you have any further concerns please do not hesitate to contact us by message, phone or making an appointment.  Have a good day   Dr Bladimir FRANCO

## 2018-06-26 NOTE — PROGRESS NOTES
Elysia Mr. Berg,  Stool culture not positive. cyclospora stain not back yet. If you have any further concerns please do not hesitate to contact us by message, phone or making an appointment.  Have a good day   Dr Bladimir FRANCO

## 2018-06-26 NOTE — PROGRESS NOTES
Elysia Mr. Berg,  Your results came back and are within acceptable limits. -Microalbumin (urine protein) level is elevated. This is suggestive of early damage to kidneys. It is better than before but still elevated. ADVISE: avoiding anti-inflamatory agents such as ibuprofen (Advil, Motrin) or naproxen (Aleve) as much as possible, keeping your blood pressure in a normal range, and seeing the nephrologist. If has any further concerns please do not hesitate to contact us by message, phone or making an appointment.  Have a good day   Dr Bladimir FRANCO

## 2018-06-26 NOTE — PROGRESS NOTES
Elysia Mr. Berg,  Your results so far are negative for C diff.. If you have any further concerns please do not hesitate to contact us by message, phone or making an appointment.  Have a good day   Dr Bladimir FRANCO

## 2018-06-29 LAB
O+P STL CONC: NORMAL
SPECIMEN SOURCE: NORMAL

## 2018-06-29 NOTE — PROGRESS NOTES
Elysia Mr. Berg,  Your results came back and prelim results show no cyclospora  If you have any further concerns please do not hesitate to contact us by message, phone or making an appointment.  Have a good day   Dr Bladimir FRANCO

## 2018-06-29 NOTE — PROGRESS NOTES
Elysia Mr. Berg,  Your final results came back and show no Cyclospora. If symptoms persist consider seeing Gi and would recommend to make appt with kidney doctor as discussed earlier irrespective of symptoms given loss of protein in urine. If you have any further concerns please do not hesitate to contact us by message, phone or making an appointment.  Have a good day   Dr Bladimir FRANCO

## 2018-07-03 ENCOUNTER — OFFICE VISIT (OUTPATIENT)
Dept: SLEEP MEDICINE | Facility: CLINIC | Age: 43
End: 2018-07-03
Payer: COMMERCIAL

## 2018-07-03 VITALS
HEART RATE: 79 BPM | BODY MASS INDEX: 36.88 KG/M2 | DIASTOLIC BLOOD PRESSURE: 87 MMHG | WEIGHT: 235 LBS | HEIGHT: 67 IN | SYSTOLIC BLOOD PRESSURE: 139 MMHG | OXYGEN SATURATION: 95 %

## 2018-07-03 DIAGNOSIS — G47.9 SLEEP DISTURBANCE: Primary | ICD-10-CM

## 2018-07-03 DIAGNOSIS — R06.83 SNORING: ICD-10-CM

## 2018-07-03 PROCEDURE — 99204 OFFICE O/P NEW MOD 45 MIN: CPT | Performed by: INTERNAL MEDICINE

## 2018-07-03 RX ORDER — HYDROXYZINE HYDROCHLORIDE 10 MG/5ML
4 SYRUP ORAL EVERY 6 HOURS PRN
COMMUNITY

## 2018-07-03 NOTE — PROGRESS NOTES
"SLEEP MEDICINE CONSULTATION NOTE    Visit Date:   07/03/2018      CHIEF COMPLAINT AND PURPOSE OF VISIT:  \"sleep apnea.\"        The patient is self-referred.       HISTORY OF PRESENT ILLNESS:    is a  42 yr old male, who presents  to sleep clinic to evaluate  for sleep apnea. He has never underwent sleep evaluation before.  He reports snoring almost on a nightly basis and it is loud in intensity.  There also have been reports of witnessed apneas during sleep.  The patient admits to snort arousals and also awakenings due to gasping for air and choking.  He has dry mouth upon awakening from sleep, but denies chronic nasal congestion or morning headaches.  He occasionally has symptoms of acid reflux.  He sleeps on his stomach and on his sides.      During the work days, his bedtime is around 10:30 p.m. and he wakes up at 6:30 c with an alarm clock.  During the weekends, his bedtime is around 11 p.m. and he wakes up spontaneously around 9:00 a.m.. It  takes 5-10 minutes for him to fall asleep, but he reports frequent , up to 6-7 nocturnal awakenings  either because of snoring or breathing difficulty. He was previously having a hard time resuming sleep after nocturnal awakenings,  but for the past 2 years he has been smoking marijuana before bed with which reports being  able to fall back asleep  easily  within 5 minutes after the nocturnal awakening.  He does not always feel refreshed upon awakening from sleep.  He drinks at least 2 cups of coffee in order to start his morning.  He reports fatigue as the day goes by sometimes and also reports excessive daytime sleepiness endorsing an [place] Sleepiness score of 13/24.  He naps twice a week for about 2 hours and feels better after he wakes up from the nap.  He has the television on with a timer when he goes to bed.      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  Previously he had to drive within a 2-hour radius also, but he has not encountered any motor vehicle accidents because of drowsy driving.      He denies any parasomnias including nightmares or sleepwalking or sleep eating or dream enactment behavior.      He denies cataplexy or sleep paralysis or hallucinations.      He denies symptoms suggestive of restless legs syndrome.      FAMILY HISTORY:  Pertinent sleep disorders:  he thinks his mom may snore.    Family History   Problem Relation Age of Onset     HEART DISEASE Father      Thyroid Cancer Sister         SOCIAL HISTORY:  He is single.  He lives with a roommate.  He works as a  for [employer].  He drinks 2 cups of coffee in the morning and occasionally 1 cup of caffeinated tea in the afternoon.  No energy drinks or soda pop.  He is a nonsmoker.  Alcohol:  5 drinks per week, never as a sleep aid, only social.  He has been smoking marijuana sometimes 5 nights a week and sometimes every night to sleep better at night for the past 2 years.  No other illicit drugs.      PAST MEDICAL HISTORY:    Past Medical History:   Diagnosis Date     BMI 37.0-37.9, adult 6/25/2018     Dermatofibroma 6/25/2018     Elevated LDL cholesterol level 6/25/2018     Environmental allergies 6/25/2018     Family history of celiac disease 6/25/2018     History of kidney donation 6/25/2018     iamPAIN IN THORACIC SPINE 9/21/2006     Multiple pigmented nevi 6/25/2018     Not immune to hepatitis B virus 6/25/2018     Snoring 6/25/2018     Patient Active Problem List   Diagnosis     BMI 37.0-37.9, adult     History of kidney donation     Snoring     Family history of celiac disease     Environmental allergies     Multiple pigmented nevi     Dermatofibroma     Not immune to hepatitis B virus     Elevated LDL cholesterol level     Morbid obesity (H)      PAST SURGICAL HISTORY:  Donated kidney 07/30/1998.      CURRENT MEDICATIONS:   Current Outpatient  "Prescriptions   Medication Sig Dispense Refill     chlorpheniramine (CHLOR-TRIMETON) 4 MG tablet Take 4 mg by mouth every 6 hours as needed for allergies or rhinitis          ALLERGIES:  Allergies   Allergen Reactions     Cockroach      Dust Mites      Pollen Extract      REVIEW OF SYSTEMS: The 14 point ROS was completed. In addition to symptoms listed under HPI, and the symptoms listed below, the rest of the ROS is negative:   His weight has fluctuated in the last few years, most recently because of  parasitic infection he lost significant body weight.      PHYSICAL EXAMINATION:   VITAL SIGNS:  /87  Pulse 79  Ht 1.702 m (5' 7\")  Wt 106.6 kg (235 lb)  SpO2 95%  BMI 36.81 kg/m2  GENERAL APPEARANCE:  Normal, in no apparent cardiopulmonary distress.   NOSE, MOUTH, THROAT:  Nasal septum midline.  Patent airflow through both the nares.  Oropharynx:  Mallampati class IV.  Prominent tongue base, crowded oropharynx, low-draping soft palate, thickened and elongated uvula and tonsillar hypertrophy 2+.   NECK:  Circumference was 18 inches.  No palpable thyromegaly, no jugular venous distention.   CARDIOVASCULAR:  Regular S1, S2.  No gallops or murmurs.   RESPIRATORY:  Clear to auscultation bilaterally, with no rales or rhonchi.   EXTREMITIES:  No calf tenderness or pedal edema.   NEUROLOGIC/PSYCHIATRIC:  Mood and affect normal. Alert and oriented 3, speech:normal; gait: normal; no focal neurological deficit.      IMPRESSION/ REPORT/ PLAN:  Snoring, reports of witnessed apneas during sleep, nonrestorative sleep, fatigue and excessive daytime sleepiness, probable obstructive sleep apnea.  The diagnosis is based on the patient's history, male gender, body habitus, neck circumference and oropharyngeal anatomy.  We discussed home study and supervised split-night sleep study.  He was interested in pursuing the supervised split-night sleep study.  Orders were generated in the Epic for the split-night study.  The results of " "the test will be communicated with him at a followup visit within 2 weeks after the test is completed.        We briefly discussed the pathophysiology of GENO, association of GENO with the various medical comorbidities and the treatment options available.  The patient said he is interested in CPAP if it is indicated to improve his sleep quality.       We discussed weight management with diet and exercise.     We discussed about keeping the schedule pretty much the same every day of the week.  Recommended avoiding the use of marijuana as a sleep aid and also avoiding consumption of alcohol within 3 hours before bed.   He was instructed to avoid driving and operating heavy machinery if drowsy or sleepy to prevent accidents.     Elevated BP: He is not a known hypertensive.  His blood pressure was slightly elevated today.  I recommended him to monitor his blood pressure and he mentioned recently his roommate has purchased a blood pressure monitor, he was instructed   to maintain  blood pressure logs  and follow up with the primary care provider if the systolic blood pressure is more than 130 and or  the diastolic blood pressure is more than 85.       The above note was dictated using voice recognition software. Although reviewed after completion, some word and grammatical error may remain . Please contact the author for any clarifications.    \"I spent a total of 45 minutes face to face with Nelson Gantfracisco Berg during today's office visit. Over 50% of this time was spent counseling the patient and  coordinating care regarding sleep apnea, association of GENO with the various medical comorbidities and the treatment options available, sleep hygiene, elevated BP\"           TANIYA NUR MD             D: 2018   T: 2018   MT: JOSE ARMANDO      Name:     NELSON BERG   MRN:      [id number]        Account:      [id number]   :      1975           Visit Date:   2018      Document: [id number]  "

## 2018-07-03 NOTE — MR AVS SNAPSHOT
"              After Visit Summary   7/3/2018    Liliam Berg    MRN: [id number]           Patient Information     Date Of Birth          1975        Visit Information        Provider Department      7/3/2018 2:00 PM Shaina Bobby MD [facility], [place], Sleep Study        Today's Diagnoses     Sleep disturbance    -  1    Snoring          Care Instructions    MY TREATMENT INFORMATION FOR SLEEP APNEA-  Liliam Berg    DOCTOR : Shaina Bobby  SLEEP CENTER :      MY CONTACT NUMBER:     Am I having a sleep study at a sleep center?  Make sure you have an appointment for the study before you leave!    Am I having a home sleep study?  Watch this video:  [email or web address]/watch?[email or web address]  Please verify your insurance coverage with your insurance carrier    Frequently asked questions:  1. What is Obstructive Sleep Apnea (GENO)? GENO is the most common type of sleep apnea. Apnea means, \"without breath.\"  Apnea is most often caused by narrowing or collapse of the upper airway as muscles relax during sleep.   Almost everyone has occasional apneas. Most people with sleep apnea have had brief interruptions at night frequently for many years.  The severity of sleep apnea is related to how frequent and severe the events are.   2. What are the consequences of GENO? Symptoms include: feeling sleepy during the day, snoring loudly, gasping or stopping of breathing, trouble sleeping, and occasionally morning headaches or heartburn at night.  Sleepiness can be serious and even increase the risk of falling asleep while driving. Other health consequences may include development of high blood pressure and other cardiovascular disease in persons who are susceptible. Untreated GENO  can contribute to heart disease, stroke and diabetes.   3. What are the treatment options? In most situations, sleep apnea is a lifelong disease that must be " managed with daily therapy. Medications are not effective for sleep apnea and surgery is generally not considered until other therapies have been tried. Your treatment is your choice . Continuous Positive Airway (CPAP) works right away and is the therapy that is effective in nearly everyone. An oral device to hold your jaw forward is usually the next most reliable option. Other options include postioning devices (to keep you off your back), weight loss, and surgery including a tongue pacing device. There is more detail about some of these options below.    Important tips for using CPAP and similar devices   Know your equipment:  CPAP is continuous positive airway pressure that prevents obstructive sleep apnea by keeping the throat from collapsing while you are sleeping. In most cases, the device is  smart  and can slowly self-adjusts if your throat collapses and keeps a record every day of how well you are treated-this information is available to you and your care team.  BPAP is bilevel positive airway pressure that keeps your throat open and also assists each breath with a pressure boost to maintain adequate breathing.  Special kinds of BPAP are used in patients who have inadequate breathing from lung or heart disease. In most cases, the device is  smart  and can slowly self-adjusts to assist breathing. Like CPAP, the device keeps a record of how well you are treated.  Your mask is your connection to the device. You get to choose what feels most comfortable and the staff will help to make sure if fits. Here: are some examples of the different masks that are available:       Key points to remember on your journey with sleep apnea:  1. Sleep study.  PAP devices often need to be adjusted during a sleep study to show that they are effective and adjusted right.  2. Good tips to remember: Try wearing just the mask during a quiet time during the day so your body adapts to wearing it. A humidifier is recommended for  comfort in most cases to prevent drying of your nose and throat. Allergy medication from your provider may help you if you are having nasal congestion.  3. Getting settled-in. It takes more than one night for most of us to get used to wearing a mask. Try wearing just the mask during a quiet time during the day so your body adapts to wearing it. A humidifier is recommended for comfort in most cases. Our team will work with you carefully on the first day and will be in contact within 4 days and again at 2 and 4 weeks for advice and remote device adjustments. Your therapy is evaluated by the device each day.   4. Use it every night. The more you are able to sleep naturally for 7-8 hours, the more likely you will have good sleep and to prevent health risks or symptoms from sleep apnea. Even if you use it 4 hours it helps. Occasionally all of us are unable to use a medical therapy, in sleep apnea, it is not dangerous to miss one night.   5. Communicate. Call our skilled team on the number provided on the first day if your visit for problems that make it difficult to wear the device. Over 2 out of 3 patients can learn to wear the device long-term with help from our team. Remember to call our team or your sleep providers if you are unable to wear the device as we may have other solutions for those who cannot adapt to mask CPAP therapy. It is recommended that you sleep your sleep provider within the first 3 months and yearly after that if you are not having problems.   Take care of your equipment. Make sure you clean your mask and tubing using directions every day and that your filter and mask are replaced as recommended or if they are not working.     BESIDES CPAP, WHAT OTHER THERAPIES ARE THERE?    Positioning Device  Positioning devices are generally used when sleep apnea is mild and only occurs on your back.This example shows a pillow that straps around the waist. It may be appropriate for those whose sleep study shows  milder sleep apnea that occurs primarily when lying flat on one's back. Preliminary studies have shown benefit but effectiveness at home may need to be verified by a home sleep test. These devices are generally not covered by medical insurance.  Examples of devices that maintain sleeping on the back to prevent snoring and mild sleep apnea.    Belt type body positioner  Http://Neverfail.[employer]/    Electronic reminder  Http://nightshifttherapy.com/  Http://www.CareParent.[employer].au/    Oral Appliance  What is oral appliance therapy?  An oral appliance device fits on your teeth at night like a retainer used after having braces. The device is made by a specialized dentist and requires several visits over 1-2 months before a manufactured device is made to fit your teeth and is adjusted to prevent your sleep apnea. Once an oral device is working properly, snoring should be improved. A home sleep test may be recommended at that time if to determine whether the sleep apnea is adequately treated.       Some things to remember:  -Oral devices are often, but not always, covered by your medical insurance. Be sure to check with your insurance provider.   -If you are referred for oral therapy, you will be given a list of specialized dentists to consider or you may choose to visit the Web site of the American Academy of Dental Sleep Medicine  -Oral devices are less likely to work if you have severe sleep apnea or are extremely overweight.     More detailed information  An oral appliance is a small acrylic device that fits over the upper and lower teeth  (similar to a retainer or a mouth guard). This device slightly moves jaw forward, which moves the base of the tongue forward, opens the airway, improves breathing for effective treat snoring and obstructive sleep apnea in perhaps 7 out of 10 people .  The best working devices are custom-made by a dental device  after a mold is made of the teeth 1, 2, 3.  When is an oral appliance  indicated?  Oral appliance therapy is recommended as a first-line treatment for patients with primary snoring, mild sleep apnea, and for patients with moderate sleep apnea who prefer appliance therapy to use of CPAP4, 5. Severity of sleep apnea is determined by sleep testing and is based on the number of respiratory events per hour of sleep.   How successful is oral appliance therapy?  The success rate of oral appliance therapy in patients with mild sleep apnea is 75-80% while in patients with moderate sleep apnea it is 50-70%. The chance of success in patients with severe sleep apnea is 40-50%. The research also shows that oral appliances have a beneficial effect on the cardiovascular health of GENO patients at the same magnitude as CPAP therapy7.  Oral appliances should be a second-line treatment in cases of severe sleep apnea, but if not completely successful then a combination therapy utilizing CPAP plus oral appliance therapy may be effective. Oral appliances tend to be effective in a broad range of patients although studies show that the patients who have the highest success are females, younger patients, those with milder disease, and less severe obesity. 3, 6.   Finding a dentist that practices dental sleep medicine  Specific training is available through the American Academy of Dental Sleep Medicine for dentists interested in working in the field of sleep. To find a dentist who is educated in the field of sleep and the use of oral appliances, near you, visit the Web site of the American Academy of Dental Sleep Medicine.    References  1. Shyam et al. Objectively measured vs self-reported compliance during oral appliance therapy for sleep-disordered breathing. Chest 2013; 144(5): 7039-5870.  2. Lacey, et al. Objective measurement of compliance during oral appliance therapy for sleep-disordered breathing. Thorax 2013; 68(1): 91-96.  3. Alexandra et al. Mandibular advancement devices in 620 men and  women with GENO and snoring: tolerability and predictors of treatment success. Chest 2004; 125: 8044-6211.  4. Emily et al. Oral appliances for snoring and GENO: a review. Sleep 2006; 29: 244-262.  5. Pam et al. Oral appliance treatment for GENO: an update. J Clin Sleep Med 2014; 10(2): 215-227.  6. Mildred et al. Predictors of OSAH treatment outcome. J Dent Res 2007; 86: 0476-2704.      Weight Loss:    Weight loss is a long-term strategy that may improve sleep apnea in some patients.    Weight management is a personal decision and the decision should be based on your interest and the potential benefits.  If you are interested in exploring weight loss strategies, the following discussion covers the impact on weight loss on sleep apnea and the approaches that may be successful.    Being overweight does not necessarily mean you will have health consequences.  Those who have BMI over 35 or over 27 with existing medical conditions carries greater risk.   Weight loss decreases severity of sleep apnea in most people with obesity. For those with mild obesity who have developed snoring with weight gain, even 15-30 pound weight loss can improve and occasionally eliminate sleep apnea.  Structured and life-long dietary and health habits are necessary to lose weight and keep healthier weight levels.     Though there may be significant health benefits from weight loss, long-term weight loss is very difficult to achieve- studies show success with dietary management in less than 10% of people. In addition, substantial weight loss may require years of dietary control and may be difficult if patients have severe obesity. In these cases, surgical management may be considered.  Finally, older individuals who have tolerated obesity without health complications may be less likely to benefit from weight loss strategies.        Your BMI is Body mass index is 36.81 kg/(m^2).  Weight management is a personal decision.  If you are  interested in exploring weight loss strategies, the following discussion covers the approaches that may be successful. Body mass index (BMI) is one way to tell whether you are at a healthy weight, overweight, or obese. It measures your weight in relation to your height.  A BMI of 18.5 to 24.9 is in the healthy range. A person with a BMI of 25 to 29.9 is considered overweight, and someone with a BMI of 30 or greater is considered obese. More than two-thirds of American adults are considered overweight or obese.  Being overweight or obese increases the risk for further weight gain. Excess weight may lead to heart disease and diabetes.  Creating and following plans for healthy eating and physical activity may help you improve your health.  Weight control is part of healthy lifestyle and includes exercise, emotional health, and healthy eating habits. Careful eating habits lifelong are the mainstay of weight control. Though there are significant health benefits from weight loss, long-term weight loss with diet alone may be very difficult to achieve- studies show long-term success with dietary management in less than 10% of people. Attaining a healthy weight may be especially difficult to achieve in those with severe obesity. In some cases, medications, devices and surgical management might be considered.  What can you do?  If you are overweight or obese and are interested in methods for weight loss, you should discuss this with your provider.     Consider reducing daily calorie intake by 500 calories.     Keep a food journal.     Avoiding skipping meals, consider cutting portions instead.    Diet combined with exercise helps maintain muscle while optimizing fat loss. Strength training is particularly important for building and maintaining muscle mass. Exercise helps reduce stress, increase energy, and improves fitness. Increasing exercise without diet control, however, may not burn enough calories to loose weight.        Start walking three days a week 10-20 minutes at a time    Work towards walking thirty minutes five days a week     Eventually, increase the speed of your walking for 1-2 minutes at time    In addition, we recommend that you review healthy lifestyles and methods for weight loss available through the National Institutes of Health patient information sites:  http://win.niddk.nih.gov/publications/index.htm    And look into health and wellness programs that may be available through your health insurance provider, employer, local community center, or toni club.          Surgery:    Surgery for obstructive sleep apnea is considered generally only when other therapies fail to work. Surgery may be discussed with you if you are having a difficult time tolerating CPAP and or when there is an abnormal structure that requires surgical correction.  Nose and throat surgeries often enlarge the airway to prevent collapse.  Most of these surgeries create pain for 1-2 weeks and up to half of the most common surgeries are not effective throughout life.  You should carefully discuss the benefits and drawbacks to surgery with your sleep provider and surgeon to determine if it is the best solution for you.   More information  Surgery for GENO is directed at areas that are responsible for narrowing or complete obstruction of the airway during sleep.  There are a wide range of procedures available to enlarge and/or stabilize the airway to prevent blockage of breathing in the three major areas where it can occur: the palate, tongue, and nasal regions.  Successful surgical treatment depends on the accurate identification of the factors responsible for obstructive sleep apnea in each person.  A personalized approach is required because there is no single treatment that works well for everyone.  Because of anatomic variation, consultation with an examination by a sleep surgeon is a critical first step in determining what surgical options  are best for each patient.  In some cases, examination during sedation may be recommended in order to guide the selection of procedures.  Patients will be counseled about risks and benefits as well as the typical recovery course after surgery. Surgery is typically not a cure for a person s GENO.  However, surgery will often significantly improve one s GENO severity (termed  success rate ).  Even in the absence of a cure, surgery will decrease the cardiovascular risk associated with OSA7; improve overall quality of life8 (sleepiness, functionality, sleep quality, etc).      Palate Procedures:  Patients with GENO often have narrowing of their airway in the region of their tonsils and uvula.  The goals of palate procedures are to widen the airway in this region as well as to help the tissues resist collapse.  Modern palate procedure techniques focus on tissue conservation and soft tissue rearrangement, rather than tissue removal.  Often the uvula is preserved in this procedure. Residual sleep apnea is common in patient after pharyngoplasty with an average reduction in sleep apnea events of 33%2.      Tongue Procedures:  ExamWhile patients are awake, the muscles that surround the throat are active and keep this region open for breathing. These muscles relax during sleep, allowing the tongue and other structures to collapse and block breathing.  There are several different tongue procedures available.  Selection of a tongue base procedure depends on characteristics seen on physical exam.  Generally, procedures are aimed at removing bulky tissues in this area or preventing the back of the tongue from falling back during sleep.  Success rates for tongue surgery range from 50-62%3.    Hypoglossal Nerve Stimulation:  Hypoglossal nerve stimulation has recently received approval from the [place] Food and Drug Administration for the treatment of obstructive sleep apnea.  This is based on research showing that the system was  safe and effective in treating sleep apnea6.  Results showed that the median AHI score decreased 68%, from 29.3 to 9.0. This therapy uses an implant system that senses breathing patterns and delivers mild stimulation to airway muscles, which keeps the airway open during sleep.  The system consists of three fully implanted components: a small generator (similar in size to a pacemaker), a breathing sensor, and a stimulation lead.  Using a small handheld remote, a patient turns the therapy on before bed and off upon awakening.    Candidates for this device must be greater than 22 years of age, have moderate to severe GENO (AHI between 20-65), BMI less than 32, have tried CPAP/oral appliance without success, and have appropriate upper airway anatomy (determined by a sleep endoscopy performed by Dr. Jay).    Hypoglossal Nerve Stimulation Pathway:    The sleep surgeon s office will work with the patient through the insurance prior-authorization process (including communications and appeals).    Nasal Procedures:  Nasal obstruction can interfere with nasal breathing during the day and night.  Studies have shown that relief of nasal obstruction can improve the ability of some patients to tolerate positive airway pressure therapy for obstructive sleep apnea1.  Treatment options include medications such as nasal saline, topical corticosteroid and antihistamine sprays, and oral medications such as antihistamines or decongestants. Non-surgical treatments can include external nasal dilators for selected patients. If these are not successful by themselves, surgery can improve the nasal airway either alone or in combination with these other options.      Combination Procedures:  Combination of surgical procedures and other treatments may be recommended, particularly if patients have more than one area of narrowing or persistent positional disease.  The success rate of combination surgery ranges from 66-80%2,3.     References  1. Severo ZAPATA. The Role of the Nose in Snoring and Obstructive Sleep Apnoea: An Update.  Eur Arch Otorhinolaryngol. 2011; 268: 1365-73.  2.  Mariano SM; Neal JA; Cesar JR; Pallanch JF; Mckay MB; Chilo SG; Isa MCQUEEN. Surgical modifications of the upper airway for obstructive sleep apnea in adults: a systematic review and meta-analysis. SLEEP 2010;33(10):6612-1427. Jorge Luis PATRICK. Hypopharyngeal surgery in obstructive sleep apnea: an evidence-based medicine review.  Arch Otolaryngol Head Neck Surg. 2006 Feb;132(2):206-13.  3. Ceferino YH1, Hakan Y, Chucho GRACE. The efficacy of anatomically based multilevel surgery for obstructive sleep apnea. Otolaryngol Head Neck Surg. 2003 Oct;129(4):327-35.  4. Jorge Luis PATRICK, Goldberg A. Hypopharyngeal Surgery in Obstructive Sleep Apnea: An Evidence-Based Medicine Review. Arch Otolaryngol Head Neck Surg. 2006 Feb;132(2):206-13.  5. Leeann PJ et al. Upper-Airway Stimulation for Obstructive Sleep Apnea.  N Engl J Med. 2014 Jan 9;370(2):139-49.  6. Rachel Y et al. Increased Incidence of Cardiovascular Disease in Middle-aged Men with Obstructive Sleep Apnea. Am J Respir Crit Care Med; 2002 166: 159-165  7. Nirav HANSON et al. Studying Life Effects and Effectiveness of Palatopharyngoplasty (SLEEP) study: Subjective Outcomes of Isolated Uvulopalatopharyngoplasty. Otolaryngol Head Neck Surg. 2011; 144: 623-631.    Please do not drive/opoerate heavy machinery,  if drowsy or sleepy;  pull over if drowsy.                  Follow-ups after your visit        Follow-up notes from your care team     Return in about 2 weeks (around 7/17/2018).      Your next 10 appointments already scheduled     Jul 15, 2018  8:00 PM CDT   PSG Split with SLEEP STUDY  6   [facility], Sleep Study ([facility])    [address] [place] [id number] [phone]            Jul 31, 2018  1:20 PM CDT   Return Sleep Patient with Shaina Davis  "MD Diamante   [facility])    [address] [place] [id number] [phone]              Future tests that were ordered for you today     Open Future Orders        Priority Expected Expires Ordered    Comprehensive Sleep Study Routine  12/30/2018 7/3/2018            Who to contact     If you have questions or need follow up information about today's clinic visit or your schedule please contact [facility] directly at [phone].  Normal or non-critical lab and imaging results will be communicated to you by Eteloshart, letter or phone within 4 business days after the clinic has received the results. If you do not hear from us within 7 days, please contact the clinic through RestoMestot or phone. If you have a critical or abnormal lab result, we will notify you by phone as soon as possible.  Submit refill requests through Genticel or call your pharmacy and they will forward the refill request to us. Please allow 3 business days for your refill to be completed.          Additional Information About Your Visit        Etelosh[employer] Marketing Information     G[employer] gives you secure access to your electronic health record. If you see a primary care provider, you can also send messages to your care team and make appointments. If you have questions, please call your primary care clinic.  If you do not have a primary care provider, please call [phone] and they will assist you.        Care EveryWhere ID     This is your Care EveryWhere ID. This could be used by other organizations to access your [place] medical records  [id number]        Your Vitals Were     Pulse Height Pulse Oximetry BMI (Body Mass Index)          79 1.702 m (5' 7\") 95% 36.81 kg/m2         Blood Pressure from Last 3 Encounters:   07/03/18 139/87   06/25/18 121/86   06/14/18 126/88    Weight from Last 3 Encounters:   07/03/18 106.6 kg (235 lb)   06/25/18 106.5 kg " (234 lb 12 oz)   06/14/18 108.9 kg (240 lb)              We Performed the Following     SLEEP EVALUATION & MANAGEMENT REFERRAL - ADULT -[facility] [place] [phone]  (Age 18 and up)        Primary Care Provider Office Phone # Fax #    Christal Garrison [phone] [address]  [facility] [address]        Equal Access to Services     GABRIELLA MORAN : Hadii aad ku hadasho Soomaali, waaxda luqadaha, qaybta kaalmada adeegyada, waxay idiin hayaan adeeg kharash la'aan ah. So [facility] [phone].    ATENCIÓN: Si habla español, tiene a tapia disposición servicios gratuitos de asistencia lingüística. Jamesoname al [phone].    We comply with applicable federal civil rights laws and [place] laws. We do not discriminate on the basis of race, color, national origin, age, disability, sex, sexual orientation, or gender identity.            Thank you!     Thank you for choosing [facility], SLEEP [facility]  for your care. Our goal is always to provide you with excellent care. Hearing back from our patients is one way we can continue to improve our services. Please take a few minutes to complete the written survey that you may receive in the mail after your visit with us. Thank you!             Your Updated Medication List - Protect others around you: Learn how to safely use, store and throw away your medicines at www.disposemymeds.org.          This list is accurate as of 7/3/18  2:51 PM.  Always use your most recent med list.                   Brand Name Dispense Instructions for use Diagnosis    chlorpheniramine 4 MG tablet    CHLOR-TRIMETON     Take 4 mg by mouth every 6 hours as needed for allergies or rhinitis

## 2018-07-03 NOTE — PROGRESS NOTES
DICTATED THE SLEEP CLINIC VISIT NOTE FOR TODAY.  Shaina Bobby MD   of Medicine,  Division of Pulmonary/Sleep Medicine  [facility].

## 2018-07-03 NOTE — PATIENT INSTRUCTIONS
"MY TREATMENT INFORMATION FOR SLEEP APNEA-  Liliam Veras Berg    DOCTOR : [facility]  SLEEP CENTER :      MY CONTACT NUMBER:     Am I having a sleep study at a sleep center?  Make sure you have an appointment for the study before you leave!    Am I having a home sleep study?  Watch this video:  https://[email or web address].com/watch?v=CteI_[email or web address]&[email or web address]  Please verify your insurance coverage with your insurance carrier    Frequently asked questions:  1. What is Obstructive Sleep Apnea (GENO)? GENO is the most common type of sleep apnea. Apnea means, \"without breath.\"  Apnea is most often caused by narrowing or collapse of the upper airway as muscles relax during sleep.   Almost everyone has occasional apneas. Most people with sleep apnea have had brief interruptions at night frequently for many years.  The severity of sleep apnea is related to how frequent and severe the events are.   2. What are the consequences of GENO? Symptoms include: feeling sleepy during the day, snoring loudly, gasping or stopping of breathing, trouble sleeping, and occasionally morning headaches or heartburn at night.  Sleepiness can be serious and even increase the risk of falling asleep while driving. Other health consequences may include development of high blood pressure and other cardiovascular disease in persons who are susceptible. Untreated GENO  can contribute to heart disease, stroke and diabetes.   3. What are the treatment options? In most situations, sleep apnea is a lifelong disease that must be managed with daily therapy. Medications are not effective for sleep apnea and surgery is generally not considered until other therapies have been tried. Your treatment is your choice . Continuous Positive Airway (CPAP) works right away and is the therapy that is effective in nearly everyone. An oral device to hold your jaw forward is usually the next most reliable option. Other options " include postioning devices (to keep you off your back), weight loss, and surgery including a tongue pacing device. There is more detail about some of these options below.    Important tips for using CPAP and similar devices   Know your equipment:  CPAP is continuous positive airway pressure that prevents obstructive sleep apnea by keeping the throat from collapsing while you are sleeping. In most cases, the device is  smart  and can slowly self-adjusts if your throat collapses and keeps a record every day of how well you are treated-this information is available to you and your care team.  BPAP is bilevel positive airway pressure that keeps your throat open and also assists each breath with a pressure boost to maintain adequate breathing.  Special kinds of BPAP are used in patients who have inadequate breathing from lung or heart disease. In most cases, the device is  smart  and can slowly self-adjusts to assist breathing. Like CPAP, the device keeps a record of how well you are treated.  Your mask is your connection to the device. You get to choose what feels most comfortable and the staff will help to make sure if fits. Here: are some examples of the different masks that are available:       Key points to remember on your journey with sleep apnea:  1. Sleep study.  PAP devices often need to be adjusted during a sleep study to show that they are effective and adjusted right.  2. Good tips to remember: Try wearing just the mask during a quiet time during the day so your body adapts to wearing it. A humidifier is recommended for comfort in most cases to prevent drying of your nose and throat. Allergy medication from your provider may help you if you are having nasal congestion.  3. Getting settled-in. It takes more than one night for most of us to get used to wearing a mask. Try wearing just the mask during a quiet time during the day so your body adapts to wearing it. A humidifier is recommended for comfort in most  cases. Our team will work with you carefully on the first day and will be in contact within 4 days and again at 2 and 4 weeks for advice and remote device adjustments. Your therapy is evaluated by the device each day.   4. Use it every night. The more you are able to sleep naturally for 7-8 hours, the more likely you will have good sleep and to prevent health risks or symptoms from sleep apnea. Even if you use it 4 hours it helps. Occasionally all of us are unable to use a medical therapy, in sleep apnea, it is not dangerous to miss one night.   5. Communicate. Call our skilled team on the number provided on the first day if your visit for problems that make it difficult to wear the device. Over 2 out of 3 patients can learn to wear the device long-term with help from our team. Remember to call our team or your sleep providers if you are unable to wear the device as we may have other solutions for those who cannot adapt to mask CPAP therapy. It is recommended that you sleep your sleep provider within the first 3 months and yearly after that if you are not having problems.   Take care of your equipment. Make sure you clean your mask and tubing using directions every day and that your filter and mask are replaced as recommended or if they are not working.     BESIDES CPAP, WHAT OTHER THERAPIES ARE THERE?    Positioning Device  Positioning devices are generally used when sleep apnea is mild and only occurs on your back.This example shows a pillow that straps around the waist. It may be appropriate for those whose sleep study shows milder sleep apnea that occurs primarily when lying flat on one's back. Preliminary studies have shown benefit but effectiveness at home may need to be verified by a home sleep test. These devices are generally not covered by medical insurance.  Examples of devices that maintain sleeping on the back to prevent snoring and mild sleep apnea.    Belt type body  positioner  Http://i-drive.Vivoxid/    Electronic reminder  Http://nightshifttherapy.com/  Http://www.3point5.com.com.au/    Oral Appliance  What is oral appliance therapy?  An oral appliance device fits on your teeth at night like a retainer used after having braces. The device is made by a specialized dentist and requires several visits over 1-2 months before a manufactured device is made to fit your teeth and is adjusted to prevent your sleep apnea. Once an oral device is working properly, snoring should be improved. A home sleep test may be recommended at that time if to determine whether the sleep apnea is adequately treated.       Some things to remember:  -Oral devices are often, but not always, covered by your medical insurance. Be sure to check with your insurance provider.   -If you are referred for oral therapy, you will be given a list of specialized dentists to consider or you may choose to visit the Web site of the American Academy of Dental Sleep Medicine  -Oral devices are less likely to work if you have severe sleep apnea or are extremely overweight.     More detailed information  An oral appliance is a small acrylic device that fits over the upper and lower teeth  (similar to a retainer or a mouth guard). This device slightly moves jaw forward, which moves the base of the tongue forward, opens the airway, improves breathing for effective treat snoring and obstructive sleep apnea in perhaps 7 out of 10 people .  The best working devices are custom-made by a dental device  after a mold is made of the teeth 1, 2, 3.  When is an oral appliance indicated?  Oral appliance therapy is recommended as a first-line treatment for patients with primary snoring, mild sleep apnea, and for patients with moderate sleep apnea who prefer appliance therapy to use of CPAP4, 5. Severity of sleep apnea is determined by sleep testing and is based on the number of respiratory events per hour of sleep.   How successful  is oral appliance therapy?  The success rate of oral appliance therapy in patients with mild sleep apnea is 75-80% while in patients with moderate sleep apnea it is 50-70%. The chance of success in patients with severe sleep apnea is 40-50%. The research also shows that oral appliances have a beneficial effect on the cardiovascular health of GENO patients at the same magnitude as CPAP therapy7.  Oral appliances should be a second-line treatment in cases of severe sleep apnea, but if not completely successful then a combination therapy utilizing CPAP plus oral appliance therapy may be effective. Oral appliances tend to be effective in a broad range of patients although studies show that the patients who have the highest success are females, younger patients, those with milder disease, and less severe obesity. 3, 6.   Finding a dentist that practices dental sleep medicine  Specific training is available through the American Academy of Dental Sleep Medicine for dentists interested in working in the field of sleep. To find a dentist who is educated in the field of sleep and the use of oral appliances, near you, visit the Web site of the American Academy of Dental Sleep Medicine.    References  1. Shyam et al. Objectively measured vs self-reported compliance during oral appliance therapy for sleep-disordered breathing. Chest 2013; 144(5): 1303-8091.  2. Lacey et al. Objective measurement of compliance during oral appliance therapy for sleep-disordered breathing. Thorax 2013; 68(1): 91-96.  3. Alexandra, et al. Mandibular advancement devices in 620 men and women with GENO and snoring: tolerability and predictors of treatment success. Chest 2004; 125: 7470-9653.  4. Emily, et al. Oral appliances for snoring and GENO: a review. Sleep 2006; 29: 244-262.  5. Pam, et al. Oral appliance treatment for GENO: an update. J Clin Sleep Med 2014; 10(2): 215-227.  6. Mildred et al. Predictors of OSAH treatment  outcome. J [place] Res 2007; 86: 4736-7339.      Weight Loss:    Weight loss is a long-term strategy that may improve sleep apnea in some patients.    Weight management is a personal decision and the decision should be based on your interest and the potential benefits.  If you are interested in exploring weight loss strategies, the following discussion covers the impact on weight loss on sleep apnea and the approaches that may be successful.    Being overweight does not necessarily mean you will have health consequences.  Those who have BMI over 35 or over 27 with existing medical conditions carries greater risk.   Weight loss decreases severity of sleep apnea in most people with obesity. For those with mild obesity who have developed snoring with weight gain, even 15-30 pound weight loss can improve and occasionally eliminate sleep apnea.  Structured and life-long dietary and health habits are necessary to lose weight and keep healthier weight levels.     Though there may be significant health benefits from weight loss, long-term weight loss is very difficult to achieve- studies show success with dietary management in less than 10% of people. In addition, substantial weight loss may require years of dietary control and may be difficult if patients have severe obesity. In these cases, surgical management may be considered.  Finally, older individuals who have tolerated obesity without health complications may be less likely to benefit from weight loss strategies.        Your BMI is Body mass index is 36.81 kg/(m^2).  Weight management is a personal decision.  If you are interested in exploring weight loss strategies, the following discussion covers the approaches that may be successful. Body mass index (BMI) is one way to tell whether you are at a healthy weight, overweight, or obese. It measures your weight in relation to your height.  A BMI of 18.5 to 24.9 is in the healthy range. A person with a BMI of 25 to 29.9 is  considered overweight, and someone with a BMI of 30 or greater is considered obese. More than two-thirds of American adults are considered overweight or obese.  Being overweight or obese increases the risk for further weight gain. Excess weight may lead to heart disease and diabetes.  Creating and following plans for healthy eating and physical activity may help you improve your health.  Weight control is part of healthy lifestyle and includes exercise, emotional health, and healthy eating habits. Careful eating habits lifelong are the mainstay of weight control. Though there are significant health benefits from weight loss, long-term weight loss with diet alone may be very difficult to achieve- studies show long-term success with dietary management in less than 10% of people. Attaining a healthy weight may be especially difficult to achieve in those with severe obesity. In some cases, medications, devices and surgical management might be considered.  What can you do?  If you are overweight or obese and are interested in methods for weight loss, you should discuss this with your provider.     Consider reducing daily calorie intake by 500 calories.     Keep a food journal.     Avoiding skipping meals, consider cutting portions instead.    Diet combined with exercise helps maintain muscle while optimizing fat loss. Strength training is particularly important for building and maintaining muscle mass. Exercise helps reduce stress, increase energy, and improves fitness. Increasing exercise without diet control, however, may not burn enough calories to loose weight.       Start walking three days a week 10-20 minutes at a time    Work towards walking thirty minutes five days a week     Eventually, increase the speed of your walking for 1-2 minutes at time    In addition, we recommend that you review healthy lifestyles and methods for weight loss available through the National Institutes of Health patient information  sites:  http://win.niddk.nih.gov/publications/index.htm    And look into health and wellness programs that may be available through your health insurance provider, employer, local community center, or toni club.          Surgery:    Surgery for obstructive sleep apnea is considered generally only when other therapies fail to work. Surgery may be discussed with you if you are having a difficult time tolerating CPAP and or when there is an abnormal structure that requires surgical correction.  Nose and throat surgeries often enlarge the airway to prevent collapse.  Most of these surgeries create pain for 1-2 weeks and up to half of the most common surgeries are not effective throughout life.  You should carefully discuss the benefits and drawbacks to surgery with your sleep provider and surgeon to determine if it is the best solution for you.   More information  Surgery for GENO is directed at areas that are responsible for narrowing or complete obstruction of the airway during sleep.  There are a wide range of procedures available to enlarge and/or stabilize the airway to prevent blockage of breathing in the three major areas where it can occur: the palate, tongue, and nasal regions.  Successful surgical treatment depends on the accurate identification of the factors responsible for obstructive sleep apnea in each person.  A personalized approach is required because there is no single treatment that works well for everyone.  Because of anatomic variation, consultation with an examination by a sleep surgeon is a critical first step in determining what surgical options are best for each patient.  In some cases, examination during sedation may be recommended in order to guide the selection of procedures.  Patients will be counseled about risks and benefits as well as the typical recovery course after surgery. Surgery is typically not a cure for a person s GENO.  However, surgery will often significantly improve one s GENO  severity (termed  success rate ).  Even in the absence of a cure, surgery will decrease the cardiovascular risk associated with OSA7; improve overall quality of life8 (sleepiness, functionality, sleep quality, etc).      Palate Procedures:  Patients with GENO often have narrowing of their airway in the region of their tonsils and uvula.  The goals of palate procedures are to widen the airway in this region as well as to help the tissues resist collapse.  Modern palate procedure techniques focus on tissue conservation and soft tissue rearrangement, rather than tissue removal.  Often the uvula is preserved in this procedure. Residual sleep apnea is common in patient after pharyngoplasty with an average reduction in sleep apnea events of 33%2.      Tongue Procedures:  ExamWhile patients are awake, the muscles that surround the throat are active and keep this region open for breathing. These muscles relax during sleep, allowing the tongue and other structures to collapse and block breathing.  There are several different tongue procedures available.  Selection of a tongue base procedure depends on characteristics seen on physical exam.  Generally, procedures are aimed at removing bulky tissues in this area or preventing the back of the tongue from falling back during sleep.  Success rates for tongue surgery range from 50-62%3.    Hypoglossal Nerve Stimulation:  Hypoglossal nerve stimulation has recently received approval from the [place] Food and Drug Administration for the treatment of obstructive sleep apnea.  This is based on research showing that the system was safe and effective in treating sleep apnea6.  Results showed that the median AHI score decreased 68%, from 29.3 to 9.0. This therapy uses an implant system that senses breathing patterns and delivers mild stimulation to airway muscles, which keeps the airway open during sleep.  The system consists of three fully implanted components: a small generator  (similar in size to a pacemaker), a breathing sensor, and a stimulation lead.  Using a small handheld remote, a patient turns the therapy on before bed and off upon awakening.    Candidates for this device must be greater than 22 years of age, have moderate to severe GENO (AHI between 20-65), BMI less than 32, have tried CPAP/oral appliance without success, and have appropriate upper airway anatomy (determined by a sleep endoscopy performed by Dr. Jay).    Hypoglossal Nerve Stimulation Pathway:    The sleep surgeon s office will work with the patient through the insurance prior-authorization process (including communications and appeals).    Nasal Procedures:  Nasal obstruction can interfere with nasal breathing during the day and night.  Studies have shown that relief of nasal obstruction can improve the ability of some patients to tolerate positive airway pressure therapy for obstructive sleep apnea1.  Treatment options include medications such as nasal saline, topical corticosteroid and antihistamine sprays, and oral medications such as antihistamines or decongestants. Non-surgical treatments can include external nasal dilators for selected patients. If these are not successful by themselves, surgery can improve the nasal airway either alone or in combination with these other options.      Combination Procedures:  Combination of surgical procedures and other treatments may be recommended, particularly if patients have more than one area of narrowing or persistent positional disease.  The success rate of combination surgery ranges from 66-80%2,3.    References  1. Severo ZAPATA. The Role of the Nose in Snoring and Obstructive Sleep Apnoea: An Update.  Eur Arch Otorhinolaryngol. 2011; 268: 1365-73.  2.  Mariano SM; Neal JA; Cesar JR; Pallanch JF; Mckay CRISOSTOMO; Chilo MAURICE; Isa MCQUEEN. Surgical modifications of the upper airway for obstructive sleep apnea in adults: a systematic review and meta-analysis. SLEEP  2010;33(10):3161-8156. Jorge Luis PATRICK. Hypopharyngeal surgery in obstructive sleep apnea: an evidence-based medicine review.  Arch Otolaryngol Head Neck Surg. 2006 Feb;132(2):206-13.  3. Ceferino BARNES, Hakan Y, Chucho EDWARDS. The efficacy of anatomically based multilevel surgery for obstructive sleep apnea. Otolaryngol Head Neck Surg. 2003 Oct;129(4):327-35.  4. Kezirian E, Goldberg A. Hypopharyngeal Surgery in Obstructive Sleep Apnea: An Evidence-Based Medicine Review. Arch Otolaryngol Head Neck Surg. 2006 Feb;132(2):206-13.  5. Leeann VICTOR et al. Upper-Airway Stimulation for Obstructive Sleep Apnea.  N Engl J Med. 2014 Jan 9;370(2):139-49.  6. Rachel Y et al. Increased Incidence of Cardiovascular Disease in Middle-aged Men with Obstructive Sleep Apnea. Am J Respir Crit Care Med; 2002 166: 159-165  7. Nirav EM et al. Studying Life Effects and Effectiveness of Palatopharyngoplasty (SLEEP) study: Subjective Outcomes of Isolated Uvulopalatopharyngoplasty. Otolaryngol Head Neck Surg. 2011; 144: 623-631.    Please do not drive/opoerate heavy machinery,  if drowsy or sleepy;  pull over if drowsy.

## 2018-07-15 ENCOUNTER — DOCUMENTATION ONLY (OUTPATIENT)
Dept: SLEEP MEDICINE | Facility: CLINIC | Age: 43
End: 2018-07-15

## 2018-07-15 ENCOUNTER — THERAPY VISIT (OUTPATIENT)
Dept: SLEEP MEDICINE | Facility: CLINIC | Age: 43
End: 2018-07-15
Payer: COMMERCIAL

## 2018-07-15 DIAGNOSIS — G47.33 OSA (OBSTRUCTIVE SLEEP APNEA): Primary | ICD-10-CM

## 2018-07-15 DIAGNOSIS — G47.9 SLEEP DISTURBANCE: ICD-10-CM

## 2018-07-15 PROCEDURE — 95810 POLYSOM 6/> YRS 4/> PARAM: CPT | Performed by: INTERNAL MEDICINE

## 2018-07-15 NOTE — MR AVS SNAPSHOT
After Visit Summary   7/15/2018    Liliam Berg    MRN: [id number]           Patient Information     Date Of Birth          1975        Visit Information        Provider Department      7/15/2018 8:00 PM SLEEP STUDY RM 6 [facility], [place], Sleep Study        Today's Diagnoses     Sleep disturbance          Care Instructions    [place] [facility]    1. Your sleep study will be reviewed by a sleep physician within the next few days.     2. Please follow up in the sleep clinic as scheduled, or, make an appointment with your sleep provider to be seen within two weeks to discuss the results of the sleep study.    3. If you have any questions or problems with your treatment plan, please contact your sleep clinic provider at [phone] to further manage your condition.    4. Please review your attached medication list, and, at your follow-up appointment advise your sleep clinic provider about any changes.    5. Go to [email or web address] for more information about your sleep problems.    DYAN Jose  July 16, 2018              Follow-ups after your visit        Your next 10 appointments already scheduled     Jul 31, 2018  1:20 PM CDT   Return Sleep Patient with Shaina Bobby MD   [facility] [place], Sleep Study ([facility])    [address] [place] [id number] [phone]              Who to contact     If you have questions or need follow up information about today's clinic visit or your schedule please contact [facility], SLEEP STUDY directly at [phone].  Normal or non-critical lab and imaging results will be communicated to you by MyChart, letter or phone within 4 business days after the clinic has received the results. If you do not hear from us within 7 days, please contact the clinic through MyChart or phone. If you have a critical or abnormal lab result, we will notify  you by phone as soon as possible.  Submit refill requests through SayNow or call your pharmacy and they will forward the refill request to us. Please allow 3 business days for your refill to be completed.          Additional Information About Your Visit        Helpful Alliancehar[employer] Image Enhancement Information     SayNow gives you secure access to your electronic health record. If you see a primary care provider, you can also send messages to your care team and make appointments. If you have questions, please call your primary care clinic.  If you do not have a primary care provider, please call [phone] and they will assist you.        Care EveryWhere ID     This is your Care EveryWhere ID. This could be used by other organizations to access your [facility] medical records  [id number]         Blood Pressure from Last 3 Encounters:   07/03/18 139/87   06/25/18 121/86   06/14/18 126/88    Weight from Last 3 Encounters:   07/03/18 106.6 kg (235 lb)   06/25/18 106.5 kg (234 lb 12 oz)   06/14/18 108.9 kg (240 lb)              We Performed the Following     Comprehensive Sleep Study        Primary Care Provider Office Phone # Fax #    Christal Garrison [phone] [address]  [facility] [address]        Equal Access to Services     GABRIELLA MORAN : Hadii rena traore hadasho Soomaali, waaxda luqadaha, qaybta kaalmada adeegyada, vik riojas. So [facility] [phone].    ATENCIÓN: Si habla español, tiene a tapia disposición servicios gratuitos de asistencia lingüística. Llame al [phone].    We comply with applicable federal civil rights laws and [place] laws. We do not discriminate on the basis of race, color, national origin, age, disability, sex, sexual orientation, or gender identity.            Thank you!     Thank you for choosing [facility] SLEEP STUDY  for your care. Our goal is always to provide you with excellent care. Hearing back from our patients is one way we can continue to improve our  services. Please take a few minutes to complete the written survey that you may receive in the mail after your visit with us. Thank you!             Your Updated Medication List - Protect others around you: Learn how to safely use, store and throw away your medicines at www.disposemymeds.org.          This list is accurate as of 7/15/18 11:59 PM.  Always use your most recent med list.                   Brand Name Dispense Instructions for use Diagnosis    chlorpheniramine 4 MG tablet    CHLOR-TRIMETON     Take 4 mg by mouth every 6 hours as needed for allergies or rhinitis

## 2018-07-15 NOTE — Clinical Note
Joseluis Bernard,  Please call pt to schedule Urgent CPAP set up. AHI>100 per h. DME orders in epic. Pt to be followed through [facility]. Needs f/u with me in 6 weeks after starting CPAP.  Thanks, Shaina Bobby MD  of Medicine, Division of Pulmonary/Sleep Medicine [facility].

## 2018-07-16 NOTE — PROCEDURES
" SLEEP STUDY INTERPRETATION  SPLIT NIGHT STUDY      Patient: NELSON RUFF  YOB: 1975  Study Date: 7/15/2018  MRN: [id number]  Referring Provider: Bladimir Siegel MD  Ordering Provider: Nitza Bobby MD    Indications for Polysomnography: The patient is a 42 y old Male who is 5' 7\" and weighs 235.0 lbs. His BMI is 36.9, Doran sleepiness scale 13.0 and neck circumference is 39.0 cm. Relevant medical history includes obesity, kidney donor and environmental  allergies. A diagnostic polysomnogram was performed to evaluate for sleep apnea. After 133.0 minutes of sleep time the patient exhibited sufficient respiratory events qualifying him for a CPAP trial which was then initiated.    Polysomnogram Data: A full night polysomnogram recorded the standard physiologic parameters including EEG, EOG, EMG, ECG, nasal and oral airflow. Respiratory parameters of chest and abdominal movements were recorded with respiratory inductance plethysmography. Oxygen saturation was recorded by pulse oximetry.  Hypopnea scoring rule used: 1B 4%    Diagnostic PSG  Sleep Architecture: sleep fragmentation and reduced sleep efficiency. REM sleep was significantly reduced.  The total recording time of the polysomnogram was 182.9 minutes. The total sleep time was 133.0 minutes. Sleep latency was decreased at 6.9 minutes without the use of a sleep aid. REM latency was 119.5 minutes. Arousal index was increased at 94.3 arousals per hour. Sleep efficiency was decreased at 72.7%. Wake after sleep onset was 40.0 minutes. The patient spent 52.3% of total sleep time in Stage N1, 44.0% in Stage N2, 0.0% in Stage N3, and 3.8% in REM. Time in REM supine was 0 minutes.    Respiration: Severe obstructive sleep apnea with sleep associated hypoxemia.    Events ? The polysomnogram revealed a presence of 93 obstructive, 20 central, and 7 mixed apneas resulting in an apnea index of 54.1 events per hour. There were 106 obstructive hypopneas " and 6 central hypopneas resulting in an obstructive hypopnea index of 47.8 and central hypopnea index of 2.7 events per hour. The combined apnea/hypopnea index was 104.7 events per hour (central apnea/hypopnea index was 11.7 events per hour).  The REM AHI was 84.0 events per hour. The supine AHI was 0 events per hour. The RERA index was 7.2 events per hour. The RDI was 111.9 events per hour.    Snoring - was reported as loud.    Respiratory rate and pattern - was notable for normal respiratory rate and pattern.    Sustained Sleep Associated Hypoventilation - Transcutaneous carbon dioxide monitoring was not used.    Sleep Associated Hypoxemia - (Greater than 5 minutes O2 sat at or below 88%) was present. Baseline oxygen saturation was 91.6%. Lowest oxygen saturation was 76.2%. Time spent less than or equal to 88% was 15.5 minutes. Time spent less than or equal to 89% was 29.5 minutes.     Treatment PSG  Sleep Architecture: significant improvement in sleep architecture with CPAP treatment, including reduced arousals, improved sleep efficiency and rebound increase in REM sleep.   At 01:42:39 AM the patient was placed on PAP treatment and was titrated at pressures ranging from CPAP 5 cmH2O up to CPAP 10 cmH2O. The total recording time of the treatment portion of the study was 270.8 minutes. The total sleep time was 236.0 minutes. During the treatment portion of the study the sleep latency was 21.0 minutes. REM latency was 30.0 minutes. Arousal index was normal at 13.0 arousals per hour. Sleep efficiency was normal  at 87.2%. Wake after sleep onset was 13.5 minutes. The patient spent 8.5% of total sleep time in Stage N1, 43.0% in Stage N2, 5.3% in Stage N3, and 43.2% in REM. Time in REM supine was 55.0 minutes.     Respiration: adequate  CPAP titration was achieved.    The final pressure was CPAP 10 cmH2O with an AHI of 7.6 events per hour. Time in REM supine on final pressure was 52.5 minutes.     This titration was  considered adequate.     Movement Activity: no significant limb movements and no abnormal sleep related behaviors.    Periodic Limb Movements  o During the diagnostic portion of the study, there were 0 PLMs recorded. The PLM index was 0 movements per hour.   o During the treatment portion of the study, there were 5 PLMs recorded. The PLM index was 1.3 movements per hour. The PLM Arousal Index was 0 per hour.    REM EMG Activity - Excessive transient/sustained muscle activity was not present.    Nocturnal Behavior - Abnormal sleep related behaviors  were not noted during/arising out of NREM / REM sleep.     Bruxism - None apparent.      Cardiac Summary: normal sinus rhythm was seen.  During the diagnostic portion of the study, the average pulse rate was 75.8 bpm. The minimum pulse rate was 50.9 bpm while the maximum pulse rate was 106.1 bpm.    During the treatment portion of the study, the average pulse rate was 67.5 bpm. The minimum pulse rate was 54.0 bpm while the maximum pulse rate was 90.0 bpm.     Arrhythmias were not noted.      Assessment:     Severe obstructive sleep apnea with sleep associated hypoxemia. Adequate  CPAP titration was achieved to control the sleep related breathing disorder.    Sleep architecture was remarkable for  sleep fragmentation and reduced sleep efficiency. REM sleep was significantly reduced. There was significant improvement in sleep architecture with CPAP treatment, including reduced arousals, improved sleep efficiency and rebound increase in REM sleep.    There were no significant limb movements and no abnormal sleep related behaviors or arrhythmias.    Recommendations:    Treatment of GENO with Auto?titrating PAP therapy with a range of 10 cmH2O to 13 cmH2O. Recommend clinical follow up with sleep management team, including review of compliance measures.    Advice regarding the risks of drowsy driving.    Suggest optimizing sleep schedule and avoiding sleep deprivation.    Weight  management (if BMI > 30).    Pharmacologic therapy should be used for management of restless legs syndrome only if present and clinically indicated and not based on the presence of periodic limb movements alone.    Diagnostic Codes:   Obstructive Sleep Apnea G47.33  Sleep Hypoxemia/Hypoventilation G47.36   Repetitive Intrusions Into Sleep F51.8     _____________________________________   Electronically Signed By: (Nitza Bobby MD), 7/16/18

## 2018-07-16 NOTE — PATIENT INSTRUCTIONS
[place] [facility]    1. Your sleep study will be reviewed by a sleep physician within the next few days.     2. Please follow up in the sleep clinic as scheduled, or, make an appointment with your sleep provider to be seen within two weeks to discuss the results of the sleep study.    3. If you have any questions or problems with your treatment plan, please contact your sleep clinic provider at [phone] to further manage your condition.    4. Please review your attached medication list, and, at your follow-up appointment advise your sleep clinic provider about any changes.    5. Go to [email or web address] for more information about your sleep problems.    DYAN Jose  July 16, 2018

## 2018-07-16 NOTE — NURSING NOTE
Completed a split night PSG per provider order.    Preliminary AHI 91.  A final therapeutic PAP pressure was achieved.    Supine REM was seen on therapeutic pressure.    Patient reports feeling refreshed in AM.

## 2018-07-19 ENCOUNTER — DOCUMENTATION ONLY (OUTPATIENT)
Dept: SLEEP MEDICINE | Facility: CLINIC | Age: 43
End: 2018-07-19
Payer: COMMERCIAL

## 2018-07-19 NOTE — PROGRESS NOTES
Patient was offered choice of vendor and chose [facility].  Patient Liliam Berg was set up at [facility] on July 19, 2018. Patient received a Resmed AirSense 10 Auto. Pressures were set at 10-13 cm H2O.   Patient s ramp is 5 cm H2O for Auto and FLEX/EPR is EPR, 2.  Patient received a Resmed Mask name: Airfit N20  Nasal mask Size Small, heated tubing and heated humidifier.  Patient is enrolled in the STM Program and does not need to meet compliance. Patient has a follow up on 09/26/18 with Dr. Bobby.    Joana Sanchez

## 2018-07-23 ENCOUNTER — DOCUMENTATION ONLY (OUTPATIENT)
Dept: SLEEP MEDICINE | Facility: CLINIC | Age: 43
End: 2018-07-23

## 2018-07-23 NOTE — PROGRESS NOTES
3 DAY STM VISIT    Diagnostic AHI: 104.7     Patient contacted for 3 day STM visit  Message left for patient to return call     Device type: Auto-CPAP  PAP settings from order::  CPAP min 10 cm  H20       CPAP max 13 cm  H20  Mask type:    Nasal Mask     Device settings from machine      Min CPAP 10.0            Max CPAP 13.0          CPAP fixed 0 cm  H20  Assessment: Nightly usage over four hours.  Action plan: Pt to have f/u 14 day STM visit.  Patient has a follow up visit scheduled:   yes within 31-90 days of set up.

## 2018-07-23 NOTE — PROGRESS NOTES
Patient returned call.     Subjective measures:  Pt states things are going well and has no issues or complaints.  Pt is benefiting from therapy.Patient meeting subjective benchmarks.     Action plan:pt to have 14 day [facility] visit.

## 2018-08-03 ENCOUNTER — DOCUMENTATION ONLY (OUTPATIENT)
Dept: SLEEP MEDICINE | Facility: CLINIC | Age: 43
End: 2018-08-03

## 2018-08-20 ENCOUNTER — DOCUMENTATION ONLY (OUTPATIENT)
Dept: SLEEP MEDICINE | Facility: CLINIC | Age: 43
End: 2018-08-20

## 2018-08-20 NOTE — PROGRESS NOTES
30 DAY STM VISIT    Diagnostic AHI: 104.7 PSG    Message left for patient to return call     Assessment: Pt meeting objective benchmarks.     Action plan: waiting for patient to return call.  and pt to have 6 month [facility] visit  Patient has a follow up visit with Dr. Bobby on 9/26/18.   Device type: Auto-CPAP  PAP settings: CPAP min 10.0 cm  H20     CPAP max 13.0 cm  H20    95th% pressure 12.4 cm  H20   Mask type:  Nasal Mask  Objective measures: 14 day rolling measures      Compliance  100 %      Leak  12.24 lpm  last  upload      AHI 1.25   last  upload      Average number of minutes 461      Objective measure goal  Compliance   Goal >70%  Leak   Goal < 24 lpm  AHI  Goal < 5  Usage  Goal >240

## 2018-09-26 ENCOUNTER — OFFICE VISIT (OUTPATIENT)
Dept: SLEEP MEDICINE | Facility: CLINIC | Age: 43
End: 2018-09-26
Payer: COMMERCIAL

## 2018-09-26 VITALS
DIASTOLIC BLOOD PRESSURE: 88 MMHG | WEIGHT: 234 LBS | RESPIRATION RATE: 18 BRPM | OXYGEN SATURATION: 94 % | HEIGHT: 67 IN | SYSTOLIC BLOOD PRESSURE: 125 MMHG | BODY MASS INDEX: 36.73 KG/M2 | HEART RATE: 75 BPM

## 2018-09-26 DIAGNOSIS — G47.33 OSA ON CPAP: Primary | ICD-10-CM

## 2018-09-26 PROCEDURE — 99214 OFFICE O/P EST MOD 30 MIN: CPT | Performed by: INTERNAL MEDICINE

## 2018-09-26 NOTE — NURSING NOTE
Cpap pressure changed from original settings of 10 Min - 13 Max to new settings of 10 Min - 15 Max.  KARINA Webb

## 2018-09-26 NOTE — MR AVS SNAPSHOT
After Visit Summary   9/26/2018    Liliam Berg    MRN: [id number]           Patient Information     Date Of Birth          1975        Visit Information        Provider Department      9/26/2018 9:00 AM Shaina Bobby MD [place] [facility]        Today's Diagnoses     GENO on CPAP    -  1      Care Instructions      Your BMI is Body mass index is 36.81 kg/(m^2).  Weight management is a personal decision.  If you are interested in exploring weight loss strategies, the following discussion covers the approaches that may be successful. Body mass index (BMI) is one way to tell whether you are at a healthy weight, overweight, or obese. It measures your weight in relation to your height.  A BMI of 18.5 to 24.9 is in the healthy range. A person with a BMI of 25 to 29.9 is considered overweight, and someone with a BMI of 30 or greater is considered obese. More than two-thirds of American adults are considered overweight or obese.  Being overweight or obese increases the risk for further weight gain. Excess weight may lead to heart disease and diabetes.  Creating and following plans for healthy eating and physical activity may help you improve your health.  Weight control is part of healthy lifestyle and includes exercise, emotional health, and healthy eating habits. Careful eating habits lifelong are the mainstay of weight control. Though there are significant health benefits from weight loss, long-term weight loss with diet alone may be very difficult to achieve- studies show long-term success with dietary management in less than 10% of people. Attaining a healthy weight may be especially difficult to achieve in those with severe obesity. In some cases, medications, devices and surgical management might be considered.  What can you do?  If you are overweight or obese and are interested in methods for weight loss, you should discuss this with your provider.      Consider reducing daily calorie intake by 500 calories.     Keep a food journal.     Avoiding skipping meals, consider cutting portions instead.    Diet combined with exercise helps maintain muscle while optimizing fat loss. Strength training is particularly important for building and maintaining muscle mass. Exercise helps reduce stress, increase energy, and improves fitness. Increasing exercise without diet control, however, may not burn enough calories to loose weight.       Start walking three days a week 10-20 minutes at a time    Work towards walking thirty minutes five days a week     Eventually, increase the speed of your walking for 1-2 minutes at time    In addition, we recommend that you review healthy lifestyles and methods for weight loss available through the National Institutes of Health patient information sites:  http://win.niddk.nih.gov/publications/index.htm    And look into health and wellness programs that may be available through your health insurance provider, employer, local community center, or toni club.    Weight management plan: Patient was referred to their PCP to discuss a diet and exercise plan.              Follow-ups after your visit        Follow-up notes from your care team     Return in about 1 year (around 9/26/2019).      Who to contact     If you have questions or need follow up information about today's clinic visit or your schedule please contact [facility] directly at [phone].  Normal or non-critical lab and imaging results will be communicated to you by MyChart, letter or phone within 4 business days after the clinic has received the results. If you do not hear from us within 7 days, please contact the clinic through MyChart or phone. If you have a critical or abnormal lab result, we will notify you by phone as soon as possible.  Submit refill requests through Nirvanix or call your pharmacy and they will forward the refill request to us.  "Please allow 3 business days for your refill to be completed.          Additional Information About Your Visit        MyChart Information     Let's Jockhart gives you secure access to your electronic health record. If you see a primary care provider, you can also send messages to your care team and make appointments. If you have questions, please call your primary care clinic.  If you do not have a primary care provider, please call [phone] and they will assist you.        Care EveryWhere ID     This is your Care EveryWhere ID. This could be used by other organizations to access your [facility] medical records  [id number]        Your Vitals Were     Pulse Respirations Height Pulse Oximetry BMI (Body Mass Index)       75 18 1.702 m (5' 7\") 94% 36.81 kg/m2        Blood Pressure from Last 3 Encounters:   09/26/18 125/88   07/03/18 139/87   06/25/18 121/86    Weight from Last 3 Encounters:   07/03/18 106.6 kg (235 lb)   06/25/18 106.5 kg (234 lb 12 oz)   06/14/18 108.9 kg (240 lb)              We Performed the Following     Comprehensive DME        Primary Care Provider Office Phone # Fax #    Christal Garrison [phone] [address] [id number]        Equal Access to Services     GABRIELLA MORAN : Hadii aad ku hadasho Soomaali, waaxda luqadaha, qaybta kaalmada adeegyada, waxay luisin haykurt riojas. So [facility] [phone].    ATENCIÓN: Si habla español, tiene a tapia disposición servicios gratuitos de asistencia lingüística. Llame al [phone].    We comply with applicable federal civil rights laws and [place] laws. We do not discriminate on the basis of race, color, national origin, age, disability, sex, sexual orientation, or gender identity.            Thank you!     Thank you for choosing [facility]  for your care. Our goal is always to provide you with excellent care. Hearing back from our patients is one way we can continue to improve our services. " Please take a few minutes to complete the written survey that you may receive in the mail after your visit with us. Thank you!             Your Updated Medication List - Protect others around you: Learn how to safely use, store and throw away your medicines at www.disposemymeds.org.          This list is accurate as of 9/26/18  9:54 AM.  Always use your most recent med list.                   Brand Name Dispense Instructions for use Diagnosis    chlorpheniramine 4 MG tablet    CHLOR-TRIMETON     Take 4 mg by mouth every 6 hours as needed for allergies or rhinitis

## 2018-09-26 NOTE — PATIENT INSTRUCTIONS

## 2018-09-27 NOTE — PROGRESS NOTES
"SLEEP CLINIC FOLLOW UP VISIT:     Date of visit: September 26, 2018    Purpose of visit:: Follow-up of GENO/CPAP use    History of present illness: Patient is a 42-year-old male who was recently diagnosed with severe obstructive sleep apnea who presents to clinic today for a follow-up visit.  The history obtained on July 15, 2018 showed AHI of 104.7/h. He was subsequently prescribed auto titrating CPAP device  and he presents to sleep clinic today for a follow-up visit.  The CPAP pressure settings are  10-13 cm of water.  He reports using the CPAP regularly during sleep.  He denies interface concerns.  There have not been any reports of snoring or awakenings due to gasping for air or choking with the CPAP use.  He reports improvement in his overall sleep quality and reports waking up more refreshed with significant improvement in overall energy levels and denies any excessive daytime sleepiness.  His [place] sleepiness score is 5 out of 24.  He denies any concerns about drowsiness while driving.    Downloadable compliance data for the past 1 month was reviewed.  He used the device for 30 out of 30 days with an average daily usage of 7 hrs and 53 minutes on the days used.  Median pressure was 11.1, 95th percentile 12.4 and max pressure 12.8 cm of water.  95th percentile for airleak was 8 L/min.  Residual AHI was 0.7/h.      Current meds, Past medical history, Past surgical history, Allergies, Social history, Family history: reviewed, per EMR    Physical exam:  /88  Pulse 75  Resp 18  Ht 1.702 m (5' 7\")  Wt 106.1 kg (234 lb)  SpO2 94%  BMI 36.65 kg/m2  General appearance:  in no apparent distress  Pt is dressed casually, cooperative with good eye contact.   Speech is spontaneous with regular rate and volume.   Mood: euthymic; affect congruent with full range and intensity.   Sensorium: awake, alert and oriented to person, place, time, and situation.    Assessment/plan: Severe obstructive sleep apnea.  " "Patient reports adequate compliance with CPAP device and reports positive benefits with the continued use of the device.  Based on the compliance measures GENO appears to be adequately controlled with the auto CPAP at the current pressure settings.  However based on the compliance measures recommended revising the pressure settings as follows :minimum =10 and max pressure =15 cm of water.  He was recommended to continue using the device regularly during sleep and was instructed to supplies for the device replaced regularly.  He was instructed to avoid driving or operating heavy machinery if drowsy or sleepy to prevent accidents.   We discussed weight management with diet and exercise.  He will follow-up with sleep clinic  in 1 year or sooner if any concerns.     The above note was dictated using voice recognition software. Although reviewed after completion, some word and grammatical error may remain . Please contact the author for any clarifications.    \"I spent a total of 25  minutes face to face with Liliam Berg during today's office   visit. Over 50% of this time was spent counseling the patient and  coordinating care regarding sleep apnea, CPAP treatment, weight management.\"       Shaina Bobby MD   of Medicine,  Division of Pulmonary/Sleep Medicine  [facility].      "

## 2019-01-15 ENCOUNTER — DOCUMENTATION ONLY (OUTPATIENT)
Dept: SLEEP MEDICINE | Facility: CLINIC | Age: 44
End: 2019-01-15

## 2019-01-15 NOTE — PROGRESS NOTES
6 month [facility]    STM Recheck Visit     Diagnostic AHI: 104.7  PSG    Data only recheck     Assessment: Pt meeting objective benchmarks.     Action plan:  pt to follow up per provider request     Device type: Auto-CPAP  PAP settings: CPAP min 10.0 cm  H20     CPAP max 15.0 cm  H20    95th% pressure 11.6 cm  H20   Objective measures: 14 day rolling measures      Compliance  100 %      Leak  18.16 lpm  last  upload      AHI 1.07   last  upload      Average number of minutes 433      Objective measure goal  Compliance   Goal >70%  Leak   Goal < 24 lpm  AHI  Goal < 5  Usage  Goal >240

## 2019-10-01 ENCOUNTER — OFFICE VISIT (OUTPATIENT)
Dept: SLEEP MEDICINE | Facility: CLINIC | Age: 44
End: 2019-10-01
Payer: COMMERCIAL

## 2019-10-01 VITALS
OXYGEN SATURATION: 96 % | HEIGHT: 67 IN | SYSTOLIC BLOOD PRESSURE: 120 MMHG | RESPIRATION RATE: 16 BRPM | HEART RATE: 62 BPM | WEIGHT: 190 LBS | BODY MASS INDEX: 29.82 KG/M2 | DIASTOLIC BLOOD PRESSURE: 80 MMHG

## 2019-10-01 DIAGNOSIS — G47.33 OSA (OBSTRUCTIVE SLEEP APNEA): Primary | ICD-10-CM

## 2019-10-01 PROCEDURE — 99214 OFFICE O/P EST MOD 30 MIN: CPT | Performed by: INTERNAL MEDICINE

## 2019-10-01 ASSESSMENT — MIFFLIN-ST. JEOR: SCORE: 1720.58

## 2019-10-01 NOTE — PROGRESS NOTES
"SLEEP CLINIC FOLLOW UP VISIT     Date of visit: 10/1/19    Purpose of visit:: Follow-up of GENO/CPAP use    History of present illness: Liliam Berg  is a 43-year-old male who was diagnosed with severe obstructive sleep apnea who presents to clinic today for a follow-up visit. PSG obtained on July 15, 2018 showed AHI of 104.7/h. He was subsequently prescribed auto titrating CPAP device. The pressure settings on his CPAP are  at 10-13 cm of water.    He reported concerns with nasal congestion in the morning with the CPAP use and this summer when it got  warmer, he stopped using the CPAP and he has not used the CPAP for the past 3 months. There have not been any reports of snoring or awakenings due to gasping for air or choking without  the CPAP use.  He  denies fatigue/ excessive daytime sleepiness without the CPAP.  His [place] sleepiness score is 3 out of 24.  He denies any concerns about drowsiness while driving.    He has lost significant weight since he was last seen at the sleep clinic on September 26, 2018 from 234 pounds then to 190 lbs currently.  His weight during the PSG in 7/15/2018  was 235 pounds.    Downloadable compliance data for the past 1 month showed poor adherence to treatment with residual AHI  less than 5/hour.    Current meds, Past medical history, Past surgical history, Allergies, Social history, Family history: reviewed, per EMR     Exam:  /80   Pulse 62   Resp 16   Ht 1.702 m (5' 7.01\")   Wt 86.2 kg (190 lb)   SpO2 96%   BMI 29.75 kg/m    General appearance:  in no apparent distress  Pt is dressed casually, cooperative with good eye contact.   Speech is spontaneous with regular rate and volume.   Mood: euthymic; affect congruent with full range and intensity.   Sensorium: awake, alert and oriented to person, place, time, and situation.     Assessment/plan: Severe obstructive sleep apnea.  Patient has not been compliant with CPAP treatment.  We discussed the consequences of " "untreated sleep apnea, considering of the degree of severity of the GENO during the PSG. He reports significant weight loss since his last sleep clinic visit . Recommended obtaining HST to re-evaluate severity of the GENO with the plan of reviewing the results of the HST via Izzy Moneyt and he was agreeable with the plan.   We discussed that if the overall severity of GENO improves,  we can consider lowering the pressure settings on his CPAP /  consider alternate options to treat sleep apnea if he is not interested in CPAP treatment.  He was also informed that non- CPAP treatment  options for GENO may not be as successful in treatment of severe GENO.    We discussed weight management with diet and exercise.    He was instructed to avoid driving or operating heavy machinery if drowsy or sleepy to prevent accidents.       The above note was dictated using voice recognition software. Although reviewed after completion, some word and grammatical error may remain . Please contact the author for any clarifications.     \"I spent a total of 25  minutes face to face with Liliam Berg during today's office visit. Over 50% of this time was spent counseling the patient and  coordinating care regarding  treatment of sleep related breathing disorder\"         Shaina Bobby MD   of Medicine,  Division of Pulmonary/Sleep Medicine  [facility].  "

## 2019-10-01 NOTE — PATIENT INSTRUCTIONS
Your BMI is Body mass index is 29.75 kg/m .  Weight management is a personal decision.  If you are interested in exploring weight loss strategies, the following discussion covers the approaches that may be successful. Body mass index (BMI) is one way to tell whether you are at a healthy weight, overweight, or obese. It measures your weight in relation to your height.  A BMI of 18.5 to 24.9 is in the healthy range. A person with a BMI of 25 to 29.9 is considered overweight, and someone with a BMI of 30 or greater is considered obese. More than two-thirds of American adults are considered overweight or obese.  Being overweight or obese increases the risk for further weight gain. Excess weight may lead to heart disease and diabetes.  Creating and following plans for healthy eating and physical activity may help you improve your health.  Weight control is part of healthy lifestyle and includes exercise, emotional health, and healthy eating habits. Careful eating habits lifelong are the mainstay of weight control. Though there are significant health benefits from weight loss, long-term weight loss with diet alone may be very difficult to achieve- studies show long-term success with dietary management in less than 10% of people. Attaining a healthy weight may be especially difficult to achieve in those with severe obesity. In some cases, medications, devices and surgical management might be considered.  What can you do?  If you are overweight or obese and are interested in methods for weight loss, you should discuss this with your provider.     Consider reducing daily calorie intake by 500 calories.     Keep a food journal.     Avoiding skipping meals, consider cutting portions instead.    Diet combined with exercise helps maintain muscle while optimizing fat loss. Strength training is particularly important for building and maintaining muscle mass. Exercise helps reduce stress, increase energy, and improves fitness.  Increasing exercise without diet control, however, may not burn enough calories to loose weight.       Start walking three days a week 10-20 minutes at a time    Work towards walking thirty minutes five days a week     Eventually, increase the speed of your walking for 1-2 minutes at time    In addition, we recommend that you review healthy lifestyles and methods for weight loss available through the National Institutes of Health patient information sites:  http://win.niddk.nih.gov/publications/index.htm    And look into health and wellness programs that may be available through your health insurance provider, employer, local community center, or toni club.    Weight management plan: Patient was referred to their PCP to discuss a diet and exercise plan.

## 2019-10-23 ENCOUNTER — DOCUMENTATION ONLY (OUTPATIENT)
Dept: SLEEP MEDICINE | Facility: CLINIC | Age: 44
End: 2019-10-23

## 2019-10-23 ENCOUNTER — OFFICE VISIT (OUTPATIENT)
Dept: SLEEP MEDICINE | Facility: CLINIC | Age: 44
End: 2019-10-23
Payer: COMMERCIAL

## 2019-10-23 DIAGNOSIS — G47.33 OSA (OBSTRUCTIVE SLEEP APNEA): ICD-10-CM

## 2019-10-23 PROCEDURE — G0399 HOME SLEEP TEST/TYPE 3 PORTA: HCPCS | Performed by: INTERNAL MEDICINE

## 2019-10-23 NOTE — PROGRESS NOTES
Pt is completing a home sleep test. Pt was instructed on how to put on the Noxturnal T3 device and associated equipment before going to bed and given the opportunity to practice putting it on before leaving the sleep center. Pt was reminded to bring the home sleep test kit back to the center tomorrow, at agreed upon time for download and reporting.     John Krishnamurthy  Sleep Clinic Specialist - [place]

## 2019-10-24 ENCOUNTER — DOCUMENTATION ONLY (OUTPATIENT)
Dept: SLEEP MEDICINE | Facility: CLINIC | Age: 44
End: 2019-10-24
Payer: COMMERCIAL

## 2019-10-24 NOTE — PROGRESS NOTES
Pt returned HST device. It was downloaded and forwarded data to the clinical specialist for scoring.    John Krishnamurthy  Sleep Clinic Specialist - [place]

## 2019-10-28 NOTE — PROGRESS NOTES
This HSAT was performed using a Noxturnal T3 device which recorded snore, sound, movement activity, body position, nasal pressure, oronasal thermal airflow, pulse, oximetry and both chest and abdominal respiratory effort. HSAT data was restricted to the time patient states they were in bed.     HSAT was scored using 1B 4% hypopnea rule.     AHI: 6.9. Snoring was reported as loud.  Time with SpO2 below 89% was 0.0 minutes.   Overall signal quality was good     Pt will follow up with sleep provider to determine appropriate therapy.    Ordering Diamante FRANCO Snigdhasmrithi Sagar, MD Charles O. BA, RPS, RST System Clinical Specialist 10/28/2019

## 2019-10-28 NOTE — PROCEDURES
"HOME SLEEP STUDY INTERPRETATION    Patient: Liliam Berg  MRN: [id number]  YOB: 1975  Study Date: 10/23/2019  Referring Provider: Christal Garrison  Ordering Provider: Shaina Bobby MD     Indications for Home Study: Liliam Berg is a 44 year old male with a history of severe obstructive sleep apnea. Patient lost significant weight loss since his last sleep study.  Home sleep study was obtained to re-evaluate severity of the GENO.    Estimated body mass index is 29.75 kg/m  as calculated from the following:    Height as of 10/1/19: 1.702 m (5' 7.01\").    Weight as of 10/1/19: 86.2 kg (190 lb).  [place] Sleepiness Scale: 3/24    Data: A full night home sleep study was performed recording the standard physiologic parameters including body position, movement, sound, nasal pressure, thermal oral airflow, chest and abdominal movements with respiratory inductance plethysmography, and oxygen saturation by pulse oximetry. Pulse rate was estimated by oximetry recording. This study was considered  adequate  based on > 4 hours of quality oximetry and respiratory recording. As specified by the AASM Manual for the Scoring of Sleep and Associated events, version 2.3, Rule VIII.D 1B, 4% oxygen desaturation scoring for hypopneas is used as a standard of care on all home sleep apnea testing.    Analysis Time: 536.1 minutes    Respiration:   Sleep Associated Hypoxemia: sustained hypoxemia was not present. Baseline oxygen saturation was 95.0 %.  Time with saturation less than or equal to 88% was 0 minutes. The lowest oxygen saturation was 85 %.   Snoring: Snoring was present(loud).  Respiratory events: The home study revealed a presence of 16 obstructive apneas and 4 mixed and central apneas. There were 42 hypopneas resulting in a combined apnea/hypopnea index [AHI] of 6.9 events per hour.  AHI was 27.3 per hour supine, - per hour prone, 5.1 per hour on left side, and 4.9 per hour on right " side.   Pattern: Excluding events noted above, respiratory rate and pattern was Normal.    Position: Percent of time spent: supine -8.6 %, prone -0 %, on left -52.5 %, on right -38.9 %.    Heart Rate: By pulse oximetry normal rate was noted, with intermittent tachycardia.    Assessment:     Mild obstructive sleep apnea, pronounced during sleep in supine position.    Sleep associated hypoxemia was not present.    Recommendations:    Suggest one of the  following options to treat the sleep apnea:a) auto titrating CPAP with pressure settings 5-15 cmH2O and clinical follow-up with review of compliance measures or b) positional therapy during sleep using devices such as slumber bump  or the FDA approved zzoma pillow (in order to avoid sleeping on the back and maximizing sleep on the sides) in combination with  oral appliance through referral to dentistry, since snoring and some obstructive events were noted even during nonsupine sleep position.    Suggest optimizing sleep hygiene and avoiding sleep deprivation.    Weight management.    Diagnosis Code(s): Obstructive Sleep Apnea G47.33, Snoring R06.83    Shaina Bobby MD, October 28, 2019   Diplomate, American Board of Internal Medicine, Sleep Medicine

## 2019-11-07 ENCOUNTER — HEALTH MAINTENANCE LETTER (OUTPATIENT)
Age: 44
End: 2019-11-07

## 2020-04-13 ENCOUNTER — TELEPHONE (OUTPATIENT)
Dept: SLEEP MEDICINE | Facility: CLINIC | Age: 45
End: 2020-04-13

## 2020-04-13 DIAGNOSIS — G47.33 OBSTRUCTIVE SLEEP APNEA (ADULT) (PEDIATRIC): Primary | ICD-10-CM

## 2020-04-13 NOTE — TELEPHONE ENCOUNTER
Reason for call:  Other   Patient called regarding (reason for call): prescription  Additional comments: Patient would like a prescription for new CPAP supplies sent over to [facility] [place] Fax #: [phone]    Phone number to reach patient:  Cell number on file:    Telephone Information:   Mobile [phone]       Best Time:  anytime    Can we leave a detailed message on this number?  YES    Travel screening: Not Applicable

## 2020-11-29 ENCOUNTER — HEALTH MAINTENANCE LETTER (OUTPATIENT)
Age: 45
End: 2020-11-29

## 2021-05-28 DIAGNOSIS — G47.33 OBSTRUCTIVE SLEEP APNEA (ADULT) (PEDIATRIC): Primary | ICD-10-CM

## 2021-09-25 ENCOUNTER — HEALTH MAINTENANCE LETTER (OUTPATIENT)
Age: 46
End: 2021-09-25

## 2022-01-15 ENCOUNTER — HEALTH MAINTENANCE LETTER (OUTPATIENT)
Age: 47
End: 2022-01-15

## 2022-08-16 DIAGNOSIS — G47.33 OSA ON CPAP: Primary | ICD-10-CM

## 2022-12-26 ENCOUNTER — HEALTH MAINTENANCE LETTER (OUTPATIENT)
Age: 47
End: 2022-12-26

## 2023-04-22 ENCOUNTER — HEALTH MAINTENANCE LETTER (OUTPATIENT)
Age: 48
End: 2023-04-22

## 2024-06-23 ENCOUNTER — HEALTH MAINTENANCE LETTER (OUTPATIENT)
Age: 49
End: 2024-06-23